# Patient Record
Sex: MALE | Race: ASIAN | NOT HISPANIC OR LATINO | Employment: FULL TIME | URBAN - METROPOLITAN AREA
[De-identification: names, ages, dates, MRNs, and addresses within clinical notes are randomized per-mention and may not be internally consistent; named-entity substitution may affect disease eponyms.]

---

## 2019-05-24 ENCOUNTER — APPOINTMENT (OUTPATIENT)
Dept: RADIOLOGY | Facility: MEDICAL CENTER | Age: 64
DRG: 065 | End: 2019-05-24
Attending: HOSPITALIST

## 2019-05-24 ENCOUNTER — APPOINTMENT (OUTPATIENT)
Dept: RADIOLOGY | Facility: MEDICAL CENTER | Age: 64
DRG: 065 | End: 2019-05-24
Attending: EMERGENCY MEDICINE

## 2019-05-24 ENCOUNTER — HOSPITAL ENCOUNTER (INPATIENT)
Facility: MEDICAL CENTER | Age: 64
LOS: 6 days | DRG: 065 | End: 2019-05-30
Attending: EMERGENCY MEDICINE | Admitting: HOSPITALIST

## 2019-05-24 DIAGNOSIS — I63.9 CEREBROVASCULAR ACCIDENT (CVA), UNSPECIFIED MECHANISM (HCC): ICD-10-CM

## 2019-05-24 DIAGNOSIS — R47.1 DYSARTHRIA: ICD-10-CM

## 2019-05-24 DIAGNOSIS — E11.65 TYPE 2 DIABETES MELLITUS WITH HYPERGLYCEMIA, WITHOUT LONG-TERM CURRENT USE OF INSULIN (HCC): ICD-10-CM

## 2019-05-24 DIAGNOSIS — R29.810 FACIAL DROOP: ICD-10-CM

## 2019-05-24 DIAGNOSIS — I10 ESSENTIAL HYPERTENSION: ICD-10-CM

## 2019-05-24 DIAGNOSIS — R53.1 WEAKNESS: ICD-10-CM

## 2019-05-24 PROBLEM — E11.9 DM (DIABETES MELLITUS) (HCC): Status: ACTIVE | Noted: 2019-05-24

## 2019-05-24 LAB
ABO GROUP BLD: NORMAL
ALBUMIN SERPL BCP-MCNC: 4.9 G/DL (ref 3.2–4.9)
ALBUMIN/GLOB SERPL: 1.7 G/DL
ALP SERPL-CCNC: 45 U/L (ref 30–99)
ALT SERPL-CCNC: 29 U/L (ref 2–50)
ANION GAP SERPL CALC-SCNC: 8 MMOL/L (ref 0–11.9)
APTT PPP: 29.9 SEC (ref 24.7–36)
AST SERPL-CCNC: 22 U/L (ref 12–45)
BASOPHILS # BLD AUTO: 0.5 % (ref 0–1.8)
BASOPHILS # BLD: 0.03 K/UL (ref 0–0.12)
BILIRUB SERPL-MCNC: 0.7 MG/DL (ref 0.1–1.5)
BLD GP AB SCN SERPL QL: NORMAL
BUN SERPL-MCNC: 21 MG/DL (ref 8–22)
CALCIUM SERPL-MCNC: 9.4 MG/DL (ref 8.5–10.5)
CHLORIDE SERPL-SCNC: 103 MMOL/L (ref 96–112)
CO2 SERPL-SCNC: 29 MMOL/L (ref 20–33)
CREAT SERPL-MCNC: 1.06 MG/DL (ref 0.5–1.4)
EKG IMPRESSION: NORMAL
EOSINOPHIL # BLD AUTO: 0.1 K/UL (ref 0–0.51)
EOSINOPHIL NFR BLD: 1.6 % (ref 0–6.9)
ERYTHROCYTE [DISTWIDTH] IN BLOOD BY AUTOMATED COUNT: 38.9 FL (ref 35.9–50)
GLOBULIN SER CALC-MCNC: 2.9 G/DL (ref 1.9–3.5)
GLUCOSE BLD-MCNC: 119 MG/DL (ref 65–99)
GLUCOSE SERPL-MCNC: 113 MG/DL (ref 65–99)
HCT VFR BLD AUTO: 49 % (ref 42–52)
HGB BLD-MCNC: 16 G/DL (ref 14–18)
IMM GRANULOCYTES # BLD AUTO: 0.01 K/UL (ref 0–0.11)
IMM GRANULOCYTES NFR BLD AUTO: 0.2 % (ref 0–0.9)
INR PPP: 0.99 (ref 0.87–1.13)
LYMPHOCYTES # BLD AUTO: 1.74 K/UL (ref 1–4.8)
LYMPHOCYTES NFR BLD: 28.6 % (ref 22–41)
MCH RBC QN AUTO: 28.3 PG (ref 27–33)
MCHC RBC AUTO-ENTMCNC: 32.7 G/DL (ref 33.7–35.3)
MCV RBC AUTO: 86.7 FL (ref 81.4–97.8)
MONOCYTES # BLD AUTO: 0.36 K/UL (ref 0–0.85)
MONOCYTES NFR BLD AUTO: 5.9 % (ref 0–13.4)
NEUTROPHILS # BLD AUTO: 3.85 K/UL (ref 1.82–7.42)
NEUTROPHILS NFR BLD: 63.2 % (ref 44–72)
NRBC # BLD AUTO: 0 K/UL
NRBC BLD-RTO: 0 /100 WBC
PLATELET # BLD AUTO: 202 K/UL (ref 164–446)
PMV BLD AUTO: 10 FL (ref 9–12.9)
POTASSIUM SERPL-SCNC: 4 MMOL/L (ref 3.6–5.5)
PROT SERPL-MCNC: 7.8 G/DL (ref 6–8.2)
PROTHROMBIN TIME: 13.2 SEC (ref 12–14.6)
RBC # BLD AUTO: 5.65 M/UL (ref 4.7–6.1)
RH BLD: NORMAL
SODIUM SERPL-SCNC: 140 MMOL/L (ref 135–145)
TROPONIN I SERPL-MCNC: <0.01 NG/ML (ref 0–0.04)
WBC # BLD AUTO: 6.1 K/UL (ref 4.8–10.8)

## 2019-05-24 PROCEDURE — 770001 HCHG ROOM/CARE - MED/SURG/GYN PRIV*

## 2019-05-24 PROCEDURE — A9270 NON-COVERED ITEM OR SERVICE: HCPCS | Performed by: HOSPITALIST

## 2019-05-24 PROCEDURE — 86850 RBC ANTIBODY SCREEN: CPT

## 2019-05-24 PROCEDURE — 84484 ASSAY OF TROPONIN QUANT: CPT

## 2019-05-24 PROCEDURE — 85610 PROTHROMBIN TIME: CPT

## 2019-05-24 PROCEDURE — 302242 IV POLE: Performed by: HOSPITALIST

## 2019-05-24 PROCEDURE — 700117 HCHG RX CONTRAST REV CODE 255: Performed by: EMERGENCY MEDICINE

## 2019-05-24 PROCEDURE — 70551 MRI BRAIN STEM W/O DYE: CPT

## 2019-05-24 PROCEDURE — 80053 COMPREHEN METABOLIC PANEL: CPT

## 2019-05-24 PROCEDURE — 85730 THROMBOPLASTIN TIME PARTIAL: CPT

## 2019-05-24 PROCEDURE — 99285 EMERGENCY DEPT VISIT HI MDM: CPT

## 2019-05-24 PROCEDURE — 85025 COMPLETE CBC W/AUTO DIFF WBC: CPT

## 2019-05-24 PROCEDURE — 86900 BLOOD TYPING SEROLOGIC ABO: CPT

## 2019-05-24 PROCEDURE — 302128 INFUSION PUMP: Performed by: HOSPITALIST

## 2019-05-24 PROCEDURE — 86901 BLOOD TYPING SEROLOGIC RH(D): CPT

## 2019-05-24 PROCEDURE — 93005 ELECTROCARDIOGRAM TRACING: CPT | Performed by: EMERGENCY MEDICINE

## 2019-05-24 PROCEDURE — 770020 HCHG ROOM/CARE - TELE (206)

## 2019-05-24 PROCEDURE — 36415 COLL VENOUS BLD VENIPUNCTURE: CPT

## 2019-05-24 PROCEDURE — 70498 CT ANGIOGRAPHY NECK: CPT

## 2019-05-24 PROCEDURE — 70496 CT ANGIOGRAPHY HEAD: CPT

## 2019-05-24 PROCEDURE — 71045 X-RAY EXAM CHEST 1 VIEW: CPT

## 2019-05-24 PROCEDURE — 700102 HCHG RX REV CODE 250 W/ 637 OVERRIDE(OP): Performed by: HOSPITALIST

## 2019-05-24 PROCEDURE — 82962 GLUCOSE BLOOD TEST: CPT

## 2019-05-24 PROCEDURE — 70450 CT HEAD/BRAIN W/O DYE: CPT

## 2019-05-24 PROCEDURE — 0042T CT-CEREBRAL PERFUSION ANALYSIS: CPT

## 2019-05-24 PROCEDURE — 700105 HCHG RX REV CODE 258: Performed by: HOSPITALIST

## 2019-05-24 PROCEDURE — 99223 1ST HOSP IP/OBS HIGH 75: CPT | Performed by: HOSPITALIST

## 2019-05-24 RX ORDER — ATORVASTATIN CALCIUM 40 MG/1
40 TABLET, FILM COATED ORAL NIGHTLY
Status: ON HOLD | COMMUNITY
End: 2019-05-30

## 2019-05-24 RX ORDER — ASPIRIN 325 MG
325 TABLET ORAL DAILY
Status: DISCONTINUED | OUTPATIENT
Start: 2019-05-24 | End: 2019-05-30 | Stop reason: HOSPADM

## 2019-05-24 RX ORDER — SODIUM CHLORIDE 9 MG/ML
INJECTION, SOLUTION INTRAVENOUS CONTINUOUS
Status: DISCONTINUED | OUTPATIENT
Start: 2019-05-24 | End: 2019-05-26

## 2019-05-24 RX ORDER — HYDRALAZINE HYDROCHLORIDE 20 MG/ML
10 INJECTION INTRAMUSCULAR; INTRAVENOUS
Status: DISCONTINUED | OUTPATIENT
Start: 2019-05-24 | End: 2019-05-30 | Stop reason: HOSPADM

## 2019-05-24 RX ORDER — LOSARTAN POTASSIUM 25 MG/1
25 TABLET ORAL DAILY
COMMUNITY

## 2019-05-24 RX ORDER — BISACODYL 10 MG
10 SUPPOSITORY, RECTAL RECTAL
Status: DISCONTINUED | OUTPATIENT
Start: 2019-05-24 | End: 2019-05-30 | Stop reason: HOSPADM

## 2019-05-24 RX ORDER — ASPIRIN 300 MG/1
300 SUPPOSITORY RECTAL DAILY
Status: DISCONTINUED | OUTPATIENT
Start: 2019-05-24 | End: 2019-05-30 | Stop reason: HOSPADM

## 2019-05-24 RX ORDER — ASPIRIN 81 MG/1
324 TABLET, CHEWABLE ORAL DAILY
Status: DISCONTINUED | OUTPATIENT
Start: 2019-05-24 | End: 2019-05-30 | Stop reason: HOSPADM

## 2019-05-24 RX ORDER — ATORVASTATIN CALCIUM 80 MG/1
80 TABLET, FILM COATED ORAL EVERY EVENING
Status: DISCONTINUED | OUTPATIENT
Start: 2019-05-24 | End: 2019-05-30 | Stop reason: HOSPADM

## 2019-05-24 RX ORDER — AMOXICILLIN 250 MG
2 CAPSULE ORAL 2 TIMES DAILY
Status: DISCONTINUED | OUTPATIENT
Start: 2019-05-25 | End: 2019-05-30 | Stop reason: HOSPADM

## 2019-05-24 RX ORDER — LABETALOL HYDROCHLORIDE 5 MG/ML
10 INJECTION, SOLUTION INTRAVENOUS EVERY 4 HOURS PRN
Status: DISCONTINUED | OUTPATIENT
Start: 2019-05-24 | End: 2019-05-30 | Stop reason: HOSPADM

## 2019-05-24 RX ORDER — POLYETHYLENE GLYCOL 3350 17 G/17G
1 POWDER, FOR SOLUTION ORAL
Status: DISCONTINUED | OUTPATIENT
Start: 2019-05-24 | End: 2019-05-30 | Stop reason: HOSPADM

## 2019-05-24 RX ADMIN — IOHEXOL 120 ML: 350 INJECTION, SOLUTION INTRAVENOUS at 18:28

## 2019-05-24 RX ADMIN — ASPIRIN 300 MG: 300 SUPPOSITORY RECTAL at 22:28

## 2019-05-24 RX ADMIN — SODIUM CHLORIDE: 9 INJECTION, SOLUTION INTRAVENOUS at 22:26

## 2019-05-24 ASSESSMENT — ENCOUNTER SYMPTOMS
FOCAL WEAKNESS: 1
CHILLS: 0
ABDOMINAL PAIN: 0
MYALGIAS: 0
NAUSEA: 0
WEAKNESS: 1
COUGH: 0
SPEECH CHANGE: 1
BLURRED VISION: 0
FEVER: 0
EYE DISCHARGE: 0
BRUISES/BLEEDS EASILY: 0
DIZZINESS: 0
HEMOPTYSIS: 0
HALLUCINATIONS: 0
DOUBLE VISION: 0
FLANK PAIN: 0
SHORTNESS OF BREATH: 0
PALPITATIONS: 0
VOMITING: 0
DEPRESSION: 0
SENSORY CHANGE: 0
HEARTBURN: 0

## 2019-05-24 ASSESSMENT — LIFESTYLE VARIABLES: SUBSTANCE_ABUSE: 0

## 2019-05-25 ENCOUNTER — APPOINTMENT (OUTPATIENT)
Dept: CARDIOLOGY | Facility: MEDICAL CENTER | Age: 64
DRG: 065 | End: 2019-05-25
Attending: HOSPITALIST

## 2019-05-25 LAB
ABO + RH BLD: NORMAL
ALBUMIN SERPL BCP-MCNC: 4.6 G/DL (ref 3.2–4.9)
ALBUMIN/GLOB SERPL: 2.2 G/DL
ALP SERPL-CCNC: 39 U/L (ref 30–99)
ALT SERPL-CCNC: 26 U/L (ref 2–50)
ANION GAP SERPL CALC-SCNC: 10 MMOL/L (ref 0–11.9)
AST SERPL-CCNC: 19 U/L (ref 12–45)
BASOPHILS # BLD AUTO: 0.4 % (ref 0–1.8)
BASOPHILS # BLD: 0.03 K/UL (ref 0–0.12)
BILIRUB SERPL-MCNC: 0.7 MG/DL (ref 0.1–1.5)
BUN SERPL-MCNC: 18 MG/DL (ref 8–22)
CALCIUM SERPL-MCNC: 7.8 MG/DL (ref 8.5–10.5)
CHLORIDE SERPL-SCNC: 105 MMOL/L (ref 96–112)
CHOLEST SERPL-MCNC: 86 MG/DL (ref 100–199)
CO2 SERPL-SCNC: 23 MMOL/L (ref 20–33)
CREAT SERPL-MCNC: 0.89 MG/DL (ref 0.5–1.4)
EOSINOPHIL # BLD AUTO: 0.15 K/UL (ref 0–0.51)
EOSINOPHIL NFR BLD: 2.1 % (ref 0–6.9)
ERYTHROCYTE [DISTWIDTH] IN BLOOD BY AUTOMATED COUNT: 39.4 FL (ref 35.9–50)
EST. AVERAGE GLUCOSE BLD GHB EST-MCNC: 166 MG/DL
GLOBULIN SER CALC-MCNC: 2.1 G/DL (ref 1.9–3.5)
GLUCOSE SERPL-MCNC: 86 MG/DL (ref 65–99)
HBA1C MFR BLD: 7.4 % (ref 0–5.6)
HCT VFR BLD AUTO: 46.5 % (ref 42–52)
HDLC SERPL-MCNC: 29 MG/DL
HGB BLD-MCNC: 14.9 G/DL (ref 14–18)
IMM GRANULOCYTES # BLD AUTO: 0.01 K/UL (ref 0–0.11)
IMM GRANULOCYTES NFR BLD AUTO: 0.1 % (ref 0–0.9)
LDLC SERPL CALC-MCNC: 43 MG/DL
LV EJECT FRACT  99904: 65
LV EJECT FRACT MOD 2C 99903: 51.14
LV EJECT FRACT MOD 4C 99902: 65.88
LV EJECT FRACT MOD BP 99901: 58.59
LYMPHOCYTES # BLD AUTO: 2.21 K/UL (ref 1–4.8)
LYMPHOCYTES NFR BLD: 30.2 % (ref 22–41)
MCH RBC QN AUTO: 28.3 PG (ref 27–33)
MCHC RBC AUTO-ENTMCNC: 32 G/DL (ref 33.7–35.3)
MCV RBC AUTO: 88.4 FL (ref 81.4–97.8)
MONOCYTES # BLD AUTO: 0.46 K/UL (ref 0–0.85)
MONOCYTES NFR BLD AUTO: 6.3 % (ref 0–13.4)
NEUTROPHILS # BLD AUTO: 4.45 K/UL (ref 1.82–7.42)
NEUTROPHILS NFR BLD: 60.9 % (ref 44–72)
NRBC # BLD AUTO: 0 K/UL
NRBC BLD-RTO: 0 /100 WBC
PLATELET # BLD AUTO: 181 K/UL (ref 164–446)
PMV BLD AUTO: 10.1 FL (ref 9–12.9)
POTASSIUM SERPL-SCNC: 4.8 MMOL/L (ref 3.6–5.5)
PROT SERPL-MCNC: 6.7 G/DL (ref 6–8.2)
RBC # BLD AUTO: 5.26 M/UL (ref 4.7–6.1)
SODIUM SERPL-SCNC: 138 MMOL/L (ref 135–145)
TRIGL SERPL-MCNC: 72 MG/DL (ref 0–149)
WBC # BLD AUTO: 7.3 K/UL (ref 4.8–10.8)

## 2019-05-25 PROCEDURE — 700102 HCHG RX REV CODE 250 W/ 637 OVERRIDE(OP): Performed by: HOSPITALIST

## 2019-05-25 PROCEDURE — 92610 EVALUATE SWALLOWING FUNCTION: CPT

## 2019-05-25 PROCEDURE — 700111 HCHG RX REV CODE 636 W/ 250 OVERRIDE (IP): Performed by: HOSPITALIST

## 2019-05-25 PROCEDURE — 93306 TTE W/DOPPLER COMPLETE: CPT

## 2019-05-25 PROCEDURE — 80061 LIPID PANEL: CPT

## 2019-05-25 PROCEDURE — 93306 TTE W/DOPPLER COMPLETE: CPT | Mod: 26 | Performed by: INTERNAL MEDICINE

## 2019-05-25 PROCEDURE — 80053 COMPREHEN METABOLIC PANEL: CPT

## 2019-05-25 PROCEDURE — 85025 COMPLETE CBC W/AUTO DIFF WBC: CPT

## 2019-05-25 PROCEDURE — 770020 HCHG ROOM/CARE - TELE (206)

## 2019-05-25 PROCEDURE — 36415 COLL VENOUS BLD VENIPUNCTURE: CPT

## 2019-05-25 PROCEDURE — A9270 NON-COVERED ITEM OR SERVICE: HCPCS | Performed by: HOSPITALIST

## 2019-05-25 PROCEDURE — 770001 HCHG ROOM/CARE - MED/SURG/GYN PRIV*

## 2019-05-25 PROCEDURE — 99233 SBSQ HOSP IP/OBS HIGH 50: CPT | Performed by: HOSPITALIST

## 2019-05-25 PROCEDURE — 83036 HEMOGLOBIN GLYCOSYLATED A1C: CPT

## 2019-05-25 RX ORDER — HEPARIN SODIUM 5000 [USP'U]/ML
5000 INJECTION, SOLUTION INTRAVENOUS; SUBCUTANEOUS EVERY 12 HOURS
Status: DISCONTINUED | OUTPATIENT
Start: 2019-05-25 | End: 2019-05-25

## 2019-05-25 RX ORDER — HEPARIN SODIUM 5000 [USP'U]/ML
5000 INJECTION, SOLUTION INTRAVENOUS; SUBCUTANEOUS EVERY 12 HOURS
Status: DISCONTINUED | OUTPATIENT
Start: 2019-05-25 | End: 2019-05-30 | Stop reason: HOSPADM

## 2019-05-25 RX ADMIN — SENNOSIDES,DOCUSATE SODIUM 2 TABLET: 8.6; 5 TABLET, FILM COATED ORAL at 17:15

## 2019-05-25 RX ADMIN — HEPARIN SODIUM 5000 UNITS: 5000 INJECTION, SOLUTION INTRAVENOUS; SUBCUTANEOUS at 22:10

## 2019-05-25 RX ADMIN — ASPIRIN 81 MG 324 MG: 81 TABLET ORAL at 17:16

## 2019-05-25 RX ADMIN — ATORVASTATIN CALCIUM 80 MG: 80 TABLET, FILM COATED ORAL at 17:15

## 2019-05-25 RX ADMIN — HEPARIN SODIUM 5000 UNITS: 5000 INJECTION, SOLUTION INTRAVENOUS; SUBCUTANEOUS at 12:11

## 2019-05-25 ASSESSMENT — COGNITIVE AND FUNCTIONAL STATUS - GENERAL
SUGGESTED CMS G CODE MODIFIER DAILY ACTIVITY: CJ
CLIMB 3 TO 5 STEPS WITH RAILING: A LITTLE
DAILY ACTIVITIY SCORE: 22
WALKING IN HOSPITAL ROOM: A LITTLE
DRESSING REGULAR LOWER BODY CLOTHING: A LITTLE
DRESSING REGULAR UPPER BODY CLOTHING: A LITTLE
SUGGESTED CMS G CODE MODIFIER MOBILITY: CJ
MOBILITY SCORE: 22

## 2019-05-25 ASSESSMENT — PATIENT HEALTH QUESTIONNAIRE - PHQ9
1. LITTLE INTEREST OR PLEASURE IN DOING THINGS: NOT AT ALL
SUM OF ALL RESPONSES TO PHQ9 QUESTIONS 1 AND 2: 0
2. FEELING DOWN, DEPRESSED, IRRITABLE, OR HOPELESS: NOT AT ALL

## 2019-05-25 ASSESSMENT — LIFESTYLE VARIABLES: ALCOHOL_USE: NO

## 2019-05-25 NOTE — ED TRIAGE NOTES
Chief Complaint   Patient presents with   • Possible Stroke     Family reports symptoms to have started around 1000 with right sides weakness/unsteady giat and slurred speech that progressively was worse around 1600.    • Slurred Speech   • Unsteady Gait   • Weakness     Pt to charge for stroke IR activation.

## 2019-05-25 NOTE — PROGRESS NOTES
"Pt A/Ox4. Denies numbness/tingling but reports a \"change in sensation\" and weakness to his right extremities. IV present and patent. Wife at bedside. Pt currently NPO, waiting for SLP to evaluate. No complaints at this time. Call light within reach.  "

## 2019-05-25 NOTE — H&P
Hospital Medicine History & Physical Note    Date of Service  5/24/2019    Primary Care Physician  No primary care provider on file.    Consultants  None    Code Status  full    Chief Complaint  Changes in speech, right sided leg weakness     History of Presenting Illness  63 y.o. male who presented 5/24/2019 with past medical history of diabetes, hypertension who presents with changes in speech and right-sided leg weakness.  This patient developed acute onset changes in speech around 2 PM today.  This was witnessed by his son.  He also was limping with right leg weakness.  He also had some left-sided facial droop.  Here in the emergency department his symptoms are persistent.  He has no other complaints.  No known alleviating or exacerbating factors.  He was seen by me outside the window for any TPA.  He will be admitted to the hospital for further management.    Review of Systems  Review of Systems   Constitutional: Negative for chills and fever.   HENT: Negative for congestion, hearing loss and tinnitus.    Eyes: Negative for blurred vision, double vision and discharge.   Respiratory: Negative for cough, hemoptysis and shortness of breath.    Cardiovascular: Negative for chest pain, palpitations and leg swelling.   Gastrointestinal: Negative for abdominal pain, heartburn, nausea and vomiting.   Genitourinary: Negative for dysuria and flank pain.   Musculoskeletal: Negative for joint pain and myalgias.   Skin: Negative for rash.   Neurological: Positive for speech change, focal weakness and weakness. Negative for dizziness and sensory change.   Endo/Heme/Allergies: Negative for environmental allergies. Does not bruise/bleed easily.   Psychiatric/Behavioral: Negative for depression, hallucinations and substance abuse.       Past Medical History  Htn, dm  Surgical History  Reviewed and not pertinent  Family History  reveiwed and not pertinent     Social History  Denies alcohol abuse, drug use or  smoking    Allergies  noknown allergies    Medications  None       Physical Exam  Temp:  [36.5 °C (97.7 °F)] 36.5 °C (97.7 °F)  Pulse:  [59-70] 70  Resp:  [16] 16  BP: (162)/(82) 162/82  SpO2:  [96 %-98 %] 96 %    Physical Exam   Constitutional: He is oriented to person, place, and time. He appears well-developed and well-nourished. He appears distressed.   HENT:   Head: Normocephalic and atraumatic.   Eyes: Pupils are equal, round, and reactive to light. Conjunctivae and EOM are normal.   Neck: Normal range of motion. Neck supple. No JVD present.   Cardiovascular: Normal rate, regular rhythm, normal heart sounds and intact distal pulses.    No murmur heard.  Pulmonary/Chest: Effort normal and breath sounds normal. No respiratory distress. He exhibits no tenderness.   Abdominal: Soft. Bowel sounds are normal. He exhibits no distension. There is no tenderness.   Musculoskeletal:   Right lower extremityw eakness 3/5 with + pronator drift, dysarthria and left sided facial droop   Neurological: He is alert and oriented to person, place, and time. No cranial nerve deficit. He exhibits normal muscle tone.   Skin: Skin is warm and dry. No erythema.   Psychiatric: He has a normal mood and affect. His behavior is normal. Judgment and thought content normal.   Nursing note and vitals reviewed.      Laboratory:  Recent Labs      05/24/19   1807   WBC  6.1   RBC  5.65   HEMOGLOBIN  16.0   HEMATOCRIT  49.0   MCV  86.7   MCH  28.3   MCHC  32.7*   RDW  38.9   PLATELETCT  202   MPV  10.0         No results for input(s): ALTSGPT, ASTSGOT, ALKPHOSPHAT, TBILIRUBIN, DBILIRUBIN, GAMMAGT, AMYLASE, LIPASE, ALB, PREALBUMIN, GLUCOSE in the last 72 hours.  Recent Labs      05/24/19   1807   APTT  29.9   INR  0.99             No results for input(s): TROPONINI in the last 72 hours.    Urinalysis:    No results found     Imaging:  DX-CHEST-PORTABLE (1 VIEW)   Final Result      No acute cardiac or pulmonary abnormality is noted.      CT-CTA  HEAD WITH & W/O-POST PROCESS   Final Result      No large vessel occlusion, high-grade stenosis or aneurysm of the [place].      CT-CTA NECK WITH & W/O-POST PROCESSING   Final Result      CT angiogram of the neck within normal limits.      CT-HEAD W/O   Final Result      1.  No acute intracranial process.      2.  Atrophy and small vessel ischemic change.      CT-CEREBRAL PERFUSION ANALYSIS   Final Result      1.  Cerebral blood flow less than 30% likely representing completed infarct = 0 mL.      2.  T Max more than 6 seconds likely representing combination of completed infarct and ischemia = 0 mL.      3.  Mismatched volume likely representing ischemic brain/penumbra = None      4.  Please note that the cerebral perfusion was performed on the limited brain tissue around the basal ganglia region. Infarct/ischemia outside the CT perfusion sections can be missed in this study.      MR-BRAIN-W/O    (Results Pending)   EC-ECHOCARDIOGRAM COMPLETE W/O CONT    (Results Pending)         Assessment/Plan:  I anticipate this patient will require at least two midnights for appropriate medical management, necessitating inpatient admission.    * CVA (cerebral vascular accident) (HCC)   Assessment & Plan    Outside of window no TPA given   NIH 4   Neuro checks   Allow for permissive htn   PT/OT/Speech evaluations  MRI brain, echo   Asa, statin  Lipid panel/a1c  Consult neuro in the am      DM (diabetes mellitus) (HCC)   Assessment & Plan    SSI ordered  a1c        HTN (hypertension)   Assessment & Plan    Allowing for permissive htn   Hold home meds for now         VTE prophylaxis: scd

## 2019-05-25 NOTE — ASSESSMENT & PLAN NOTE
Outside of window no TPA given   NIH 4 on arrival  Continue neuro checks  Allow for permissive HTN through 5/26  Acute left pontine infarct  Continue asa and statin  Continue therapies  Snf/ rehab acceptance pending

## 2019-05-25 NOTE — PROGRESS NOTES
Hospital Medicine Daily Progress Note    Date of Service  5/25/2019    Chief Complaint  63 y.o. male admitted 5/24/2019 with speech changes and right-sided weakness    Hospital Course    63 y.o. male who presented 5/24/2019 with past medical history of diabetes, hypertension who presents with changes in speech and right-sided leg weakness.    He was outside the window for any TPA.  CT noncontrast negative for acute CVA and CTA head and neck negative for aneurysm or stenosis.       Interval Problem Update  Seen and examined this morning, wife at bedside  Still having speech difficulties, right hand and arm weakness, right lower extremity improving  Lungs clear, patient regular rhythm, no other complaints  Awaiting MRI read and further stroke work-up  HDL 29 LDL 43 triglyceride 72    Consultants/Specialty  Neurology    Code Status  Full code    Disposition  Inpatient, continue on telemetry on neuro floor    Review of Systems  Review of Systems   Unable to perform ROS: Acuity of condition        Physical Exam  Temp:  [36.4 °C (97.5 °F)-36.9 °C (98.4 °F)] 36.6 °C (97.9 °F)  Pulse:  [50-70] 50  Resp:  [16-17] 17  BP: (137-166)/(61-82) 137/61  SpO2:  [95 %-98 %] 95 %    Physical Exam   Constitutional: He appears well-developed and well-nourished. No distress.   HENT:   Head: Normocephalic and atraumatic.   Mouth/Throat: Oropharynx is clear and moist.   Eyes: Conjunctivae and EOM are normal. Right eye exhibits no discharge. Left eye exhibits no discharge. No scleral icterus.   Neck: Normal range of motion. Neck supple.   Cardiovascular: Normal rate, normal heart sounds and intact distal pulses.    No murmur heard.  Pulmonary/Chest: Effort normal and breath sounds normal. No stridor. No respiratory distress. He has no wheezes.   Abdominal: Soft. Bowel sounds are normal. He exhibits no distension. There is no tenderness. There is no rebound.   Musculoskeletal: He exhibits no edema or tenderness.   Neurological: He is alert.    Unable to assess orientation due to aphasia/confusion  Right upper extremity strength 3/5  Right lower extremity strength 4/5   Skin: Skin is warm and dry. No rash noted. He is not diaphoretic. No erythema. No pallor.   Psychiatric:   Confused   Nursing note and vitals reviewed.      Fluids  No intake or output data in the 24 hours ending 05/25/19 0934    Laboratory  Recent Labs      05/24/19 1807 05/25/19 0215   WBC  6.1  7.3   RBC  5.65  5.26   HEMOGLOBIN  16.0  14.9   HEMATOCRIT  49.0  46.5   MCV  86.7  88.4   MCH  28.3  28.3   MCHC  32.7*  32.0*   RDW  38.9  39.4   PLATELETCT  202  181   MPV  10.0  10.1     Recent Labs      05/24/19 1807 05/25/19 0215   SODIUM  140  138   POTASSIUM  4.0  4.8   CHLORIDE  103  105   CO2  29  23   GLUCOSE  113*  86   BUN  21  18   CREATININE  1.06  0.89   CALCIUM  9.4  7.8*     Recent Labs      05/24/19 1807   APTT  29.9   INR  0.99         Recent Labs      05/25/19   0215   TRIGLYCERIDE  72   HDL  29*   LDL  43       Imaging  DX-CHEST-PORTABLE (1 VIEW)   Final Result      No acute cardiac or pulmonary abnormality is noted.      CT-CTA HEAD WITH & W/O-POST PROCESS   Final Result      No large vessel occlusion, high-grade stenosis or aneurysm of the [place] [place].      CT-CTA NECK WITH & W/O-POST PROCESSING   Final Result      CT angiogram of the neck within normal limits.      CT-HEAD W/O   Final Result      1.  No acute intracranial process.      2.  Atrophy and small vessel ischemic change.      CT-CEREBRAL PERFUSION ANALYSIS   Final Result      1.  Cerebral blood flow less than 30% likely representing completed infarct = 0 mL.      2.  T Max more than 6 seconds likely representing combination of completed infarct and ischemia = 0 mL.      3.  Mismatched volume likely representing ischemic brain/penumbra = None      4.  Please note that the cerebral perfusion was performed on the limited brain tissue around the basal ganglia region. Infarct/ischemia outside the  CT perfusion sections can be missed in this study.      MR-BRAIN-W/O    (Results Pending)   EC-ECHOCARDIOGRAM COMPLETE W/O CONT    (Results Pending)        Assessment/Plan  * CVA (cerebral vascular accident) ([facility])   Assessment & Plan    Outside of window no TPA given   NIH 4 on arrival  Continue neuro checks   Allow for permissive htn   PT/OT/Speech evaluations pending  MRI brain, echo pending  Asa, statin continues  A1c pending  Lipid panel okay  Awaiting MRI results, will discuss with neurology     DM (diabetes mellitus) ([facility])   Assessment & Plan    SSI ordered  a1c        HTN (hypertension)   Assessment & Plan    Allowing for permissive htn   Hold home meds for now          VTE prophylaxis: SCDs, can start heparin q12

## 2019-05-25 NOTE — ED NOTES
Patient to CT w/TNTL on cardiac monitor then BL 19, report to SUSANNE Lantigua, family at bedside.

## 2019-05-25 NOTE — THERAPY
"Speech Language Therapy Clinical Swallow Evaluation completed.  Functional Status: 62 YO male seen this date for clinical swallow evaluation 2/2 left pontine CVA and failed nurse dysphagia screen. Pt is primarily Armenian speaking, attempted to use Language [employer] for translation, program was \"initializing\" for entire 40 minutes of evaluation. Pt was awake, alert, followed directions with visual and verbal cues and participated in evaluation. Pt's son served as  long-term through evaluation. Oral mechanism examination completed. Lingual deviation to the right upon protrusion. Tense right sided facial structures appreciated resulting in asymmetry. Dentition intact but of poor quality. Right upper extremity weakness observed, characterized by inability to hold pudding cup. Pt's son reports pt's speech is different than normal and not clear. PO trials of ice, NTL, dry solids, mixed consistency and thins via cup and straw assessed. Hyolaryngeal elevation palpated as complete, timely initiation of swallow appreciated and clear vocal quality maintained throughout evaluation. Pt demonstrated adequat rotary jaw movement for pulverization and manipulation of bolus. Throat clear appreciated in 30% of mixed consistency trials, however vocal quality remained clear. Suspect throat clear 2/2 to pt's spouse feeding him large bites at a fast rate. No c/o odynophagia or globus sensation. No other s/sx of aspiration appreciated with any other consistencies consumed. Education provided re: importance of slow rate and small bite size. Recommend initiation of D3/thin liquid diet with adherence to the following strategies: HOB at 90*, small bites/sips, slow rate, feeding assistance as necessary,  SLP following.   Recommendations - Diet: Diet / Liquid Recommendation: Dysphagia III, Thin Liquid                          Strategies: Monitor during meals, Assistance needed for meal tray set-up and Head of Bed at 90 Degrees                " "          Medication Administration: Medication Administration : Float Whole with Puree  Plan of Care: Will benefit from Speech Therapy 5 times per week  Post-Acute Therapy: Recommend inpatient transitional care services for continued speech therapy services.        See \"Rehab Therapy-Acute\" Patient Summary Report for complete documentation.   "

## 2019-05-25 NOTE — PROGRESS NOTES
2 RN skin check complete. Skin is intact around bony prominences, no signs of pressure injuries or wounds at this time. Pt would not allow female staff to assess sacrum, per report he has intact skin.

## 2019-05-25 NOTE — DISCHARGE PLANNING
Aware of PMR referral from Dr. Quispe. Changes in speech, right-sided weakness. Outside window for tPA. MRI brain - acute left pontine infarct. Workup ongoing, therapy evals pending. No Physiatry consult ordered per protocol. TCC following. Thank you for the referral.     Please have PFA screen for insurance provider.

## 2019-05-25 NOTE — ED PROVIDER NOTES
ED Provider Note    Scribed for Gavino Yost M.D. by Buffy Massey. 5/24/2019  6:10 PM    Primary care provider: No primary care provider on file.  Means of arrival: EMS  History obtained from: Patient  History limited by: None    CHIEF COMPLAINT  Chief Complaint   Patient presents with   • Possible Stroke     Family reports symptoms to have started around 1000 with right sides weakness/unsteady giat and slurred speech that progressively was worse around 1600.    • Slurred Speech   • Unsteady Gait   • Weakness       HPI  Delbert Esequiel Montague is a 63 y.o. male, with history of diabetes and hypertension, who presents to the Emergency Department for slight facial weakness, onset at 1400 today. This morning at 1000, the patient experienced some dizziness and had 1 episode of vomiting. Later this evening, he started to experience associated right arm numbness, right lower extremity weakness, and slurred speech. He denies fevers, rhinorrhea, cough, congestion, or other infectious symptoms.    REVIEW OF SYSTEMS  Pertinent positives include right facial weakness, slurred speech, dizziness, right upper extremity numbness, and right lower extremity weakness. Pertinent negatives include no rhinorrhea, cough, or congestion.  All other systems reviewed and negative.    PAST MEDICAL HISTORY     Patient has a history of diabetes and hypertension. He has no history of myocardial infarction of TIA.     SURGICAL HISTORY  patient denies any surgical history    SOCIAL HISTORY  Social History   Substance Use Topics   • Smoking status: None noted   • Smokeless tobacco: None noted   • Alcohol use None noted      History   Drug use: None noted       FAMILY HISTORY  No family history on file.    CURRENT MEDICATIONS  Home Medications     Reviewed by Lars Joe (Pharmacy Tech) on 05/24/19 at 1934  Med List Status: Complete   Medication Last Dose Status   atorvastatin (LIPITOR) 40 MG Tab 5/23/2019 Active   Canagliflozin (INVOKANA)  100 MG Tab 5/24/2019 Active   losartan (COZAAR) 25 MG Tab 5/24/2019 Active   metformin (GLUCOPHAGE) 1000 MG tablet 5/24/2019 Active   Multiple Vitamins-Minerals (MENS 50+ MULTI VITAMIN/MIN) Tab 5/24/2019 Active                ALLERGIES  No Known Allergies    PHYSICAL EXAM  VITAL SIGNS: BP (!) 162/82   Pulse (!) 59   Temp 36.5 °C (97.7 °F) (Temporal)   Resp 16   Wt 63 kg (138 lb 14.2 oz)   SpO2 98%     Constitutional: Well developed, Well nourished, Mild distress, Non-toxic appearance.   HENT: Normocephalic, Atraumatic, Bilateral external ears normal, Oropharynx moist, No oral exudates.   Eyes: PERRLA, EOMI, Conjunctiva normal, No discharge.   Neck: No tenderness, Supple, No stridor.   Lymphatic: No lymphadenopathy noted.   Cardiovascular: Normal heart rate, Normal rhythm.   Thorax & Lungs: Clear to auscultation bilaterally, No respiratory distress, No wheezing, No crackles.   Abdomen: Soft, No tenderness, No masses, No pulsatile masses.   Skin: Warm, Dry, No erythema, No rash.   Extremities:, No edema No cyanosis.   Musculoskeletal: No tenderness to palpation or major deformities noted.  Intact distal pulses  Neurologic: Awake, alert. Moves all extremities spontaneously. Drift on the right leg. No drift on the right arm or any other extremity. Decreased sensation on the right lower extremity.  Psychiatric: Affect normal, Judgment normal, Mood normal.     LABS  Labs Reviewed   CBC WITH DIFFERENTIAL - Abnormal; Notable for the following:        Result Value    MCHC 32.7 (*)     All other components within normal limits    Narrative:     Indicate which anticoagulants the patient is on:->UNKNOWN   COMP METABOLIC PANEL - Abnormal; Notable for the following:     Glucose 113 (*)     All other components within normal limits    Narrative:     Indicate which anticoagulants the patient is on:->UNKNOWN   ACCU-CHEK GLUCOSE - Abnormal; Notable for the following:     Glucose - Accu-Ck 119 (*)     All other components within  normal limits   PROTHROMBIN TIME    Narrative:     Indicate which anticoagulants the patient is on:->UNKNOWN   APTT    Narrative:     Indicate which anticoagulants the patient is on:->UNKNOWN   COD (ADULT)   TROPONIN    Narrative:     Indicate which anticoagulants the patient is on:->UNKNOWN   ESTIMATED GFR    Narrative:     Indicate which anticoagulants the patient is on:->UNKNOWN   URINALYSIS,CULTURE IF INDICATED   ABO RH CONFIRM   HEMOGLOBIN A1C   CBC WITH DIFFERENTIAL   COMP METABOLIC PANEL   LIPID PROFILE     All labs reviewed by me.    EKG  Results for orders placed or performed during the hospital encounter of 19   EKG (NOW)   Result Value Ref Range    Report       [facility]    Test Date:  2019  Pt Name:    EROS LAWLER                 Department: ER  MRN:        [id number]                      Room:        19  Gender:     Male                         Technician: [id number]  :        1955                   Requested By:ZAC SANDRA  Order #:    [id number]                    Reading MD: ZAC SANDRA MD    Measurements  Intervals                                Axis  Rate:       60                           P:          67  TN:         172                          QRS:        44  QRSD:       144                          T:          23  QT:         432  QTc:        432    Interpretive Statements  SINUS RHYTHM  RIGHT BUNDLE BRANCH BLOCK  No previous ECG available for comparison    Electronically Signed On 2019 18:43:42 PDT by ZAC SANDRA MD       RADIOLOGY  DX-CHEST-PORTABLE (1 VIEW)   Final Result      No acute cardiac or pulmonary abnormality is noted.      CT-CTA HEAD WITH & W/O-POST PROCESS   Final Result      No large vessel occlusion, high-grade stenosis or aneurysm of the [place].      CT-CTA NECK WITH & W/O-POST PROCESSING   Final Result      CT angiogram of the neck within normal limits.      CT-HEAD W/O   Final Result       1.  No acute intracranial process.      2.  Atrophy and small vessel ischemic change.      CT-CEREBRAL PERFUSION ANALYSIS   Final Result      1.  Cerebral blood flow less than 30% likely representing completed infarct = 0 mL.      2.  T Max more than 6 seconds likely representing combination of completed infarct and ischemia = 0 mL.      3.  Mismatched volume likely representing ischemic brain/penumbra = None      4.  Please note that the cerebral perfusion was performed on the limited brain tissue around the basal ganglia region. Infarct/ischemia outside the CT perfusion sections can be missed in this study.      MR-BRAIN-W/O    (Results Pending)   EC-ECHOCARDIOGRAM COMPLETE W/O CONT    (Results Pending)     The radiologist's interpretation of all radiological studies have been reviewed by me.      COURSE & MEDICAL DECISION MAKING  Pertinent Labs & Imaging studies reviewed. (See chart for details)    I reviewed the patient's medical records which showed nothing    6:10 PM - Patient seen and examined at bedside. Ordered DX-Chest, CT-Head, CT-Cerebral perfusion analysis, CT-CTA Head with and without, CT-CTA neck with and without, CBC with differential, CMP, PTT, APTT, COD (Adult), Troponin, urinalysis with culture if indicated, and EKG to evaluate his symptoms. Total NIH Stoke Scale is 4 for dysarthria, decreased sensation on the right, drift on the right lower extremity, and facial droop. I discussed with the patient's family that he is outside the window for TPA and plan for CT imaging to evaluate for a clot and admission to the hospital.    6:42 PM - I discussed the patient's case and the above findings with Dr. Quispe (Hospitalist) who agrees to accept the patient to his service. Care transitioned at this time.    Decision Making:  Patient is coming in with left-sided facial droop, very faint, the patient is also having a little bit of dysarthria, some drift, decreased since recent changes on my right leg.   Patient's NIH is 4, the patient is out of the alteplase window therefore alteplase was not given.  CT perfusion scan was negative, CTAs were unremarkable, discussed the case with the hospitalist for admission the hospital.    DISPOSITION:  Patient will be admitted to Dr. Quispe in guarded condition.    FINAL IMPRESSION  1. Facial droop    2. Weakness    3. Dysarthria          Buffy ROE (Scribe), am scribing for, and in the presence of, Gavino Yost M.D..    Electronically signed by: Buffy Massey (Scribe), 5/24/2019    IGavino M.D. personally performed the services described in this documentation, as scribed by Buffy Massey in my presence, and it is both accurate and complete.    The note accurately reflects work and decisions made by me.  Gavino Yost  5/24/2019  11:36 PM    C

## 2019-05-25 NOTE — PROGRESS NOTES
Pt arrived to floor via transport. Family at bedside. AAOx4, reporting R sided weakness, numbness and tingling. Denies pain. Cardiac monitor in place. SCDs on. Pt NPO per ER RN dysphagia screen. POC discussed. Pt resting comfortably, treaded socks in place, bed locked and in lowest position, bed alarm on, call light in reach, hourly rounding in place.

## 2019-05-25 NOTE — ED NOTES
Med Rec updated and complete per rx bottles at bedside  Allergies have been verified   No oral ABX within the last 30 days  Pt Home Pharmacy:[facility]

## 2019-05-26 LAB
ANION GAP SERPL CALC-SCNC: 11 MMOL/L (ref 0–11.9)
BASOPHILS # BLD AUTO: 0.3 % (ref 0–1.8)
BASOPHILS # BLD: 0.02 K/UL (ref 0–0.12)
BUN SERPL-MCNC: 25 MG/DL (ref 8–22)
CALCIUM SERPL-MCNC: 9.2 MG/DL (ref 8.5–10.5)
CHLORIDE SERPL-SCNC: 103 MMOL/L (ref 96–112)
CO2 SERPL-SCNC: 25 MMOL/L (ref 20–33)
CREAT SERPL-MCNC: 0.96 MG/DL (ref 0.5–1.4)
EOSINOPHIL # BLD AUTO: 0.14 K/UL (ref 0–0.51)
EOSINOPHIL NFR BLD: 1.8 % (ref 0–6.9)
ERYTHROCYTE [DISTWIDTH] IN BLOOD BY AUTOMATED COUNT: 38.3 FL (ref 35.9–50)
GLUCOSE BLD-MCNC: 137 MG/DL (ref 65–99)
GLUCOSE BLD-MCNC: 159 MG/DL (ref 65–99)
GLUCOSE SERPL-MCNC: 117 MG/DL (ref 65–99)
HCT VFR BLD AUTO: 44.8 % (ref 42–52)
HGB BLD-MCNC: 15 G/DL (ref 14–18)
IMM GRANULOCYTES # BLD AUTO: 0.02 K/UL (ref 0–0.11)
IMM GRANULOCYTES NFR BLD AUTO: 0.3 % (ref 0–0.9)
LYMPHOCYTES # BLD AUTO: 1.33 K/UL (ref 1–4.8)
LYMPHOCYTES NFR BLD: 17.3 % (ref 22–41)
MCH RBC QN AUTO: 28.8 PG (ref 27–33)
MCHC RBC AUTO-ENTMCNC: 33.5 G/DL (ref 33.7–35.3)
MCV RBC AUTO: 86.2 FL (ref 81.4–97.8)
MONOCYTES # BLD AUTO: 0.5 K/UL (ref 0–0.85)
MONOCYTES NFR BLD AUTO: 6.5 % (ref 0–13.4)
NEUTROPHILS # BLD AUTO: 5.7 K/UL (ref 1.82–7.42)
NEUTROPHILS NFR BLD: 73.8 % (ref 44–72)
NRBC # BLD AUTO: 0 K/UL
NRBC BLD-RTO: 0 /100 WBC
PLATELET # BLD AUTO: 193 K/UL (ref 164–446)
PMV BLD AUTO: 10.5 FL (ref 9–12.9)
POTASSIUM SERPL-SCNC: 3.6 MMOL/L (ref 3.6–5.5)
RBC # BLD AUTO: 5.2 M/UL (ref 4.7–6.1)
SODIUM SERPL-SCNC: 139 MMOL/L (ref 135–145)
WBC # BLD AUTO: 7.7 K/UL (ref 4.8–10.8)

## 2019-05-26 PROCEDURE — 97166 OT EVAL MOD COMPLEX 45 MIN: CPT

## 2019-05-26 PROCEDURE — 85025 COMPLETE CBC W/AUTO DIFF WBC: CPT

## 2019-05-26 PROCEDURE — 97162 PT EVAL MOD COMPLEX 30 MIN: CPT

## 2019-05-26 PROCEDURE — 36415 COLL VENOUS BLD VENIPUNCTURE: CPT

## 2019-05-26 PROCEDURE — 700111 HCHG RX REV CODE 636 W/ 250 OVERRIDE (IP): Performed by: HOSPITALIST

## 2019-05-26 PROCEDURE — 82962 GLUCOSE BLOOD TEST: CPT

## 2019-05-26 PROCEDURE — 770020 HCHG ROOM/CARE - TELE (206)

## 2019-05-26 PROCEDURE — 700102 HCHG RX REV CODE 250 W/ 637 OVERRIDE(OP): Performed by: HOSPITALIST

## 2019-05-26 PROCEDURE — A9270 NON-COVERED ITEM OR SERVICE: HCPCS | Performed by: HOSPITALIST

## 2019-05-26 PROCEDURE — 80048 BASIC METABOLIC PNL TOTAL CA: CPT

## 2019-05-26 PROCEDURE — 99233 SBSQ HOSP IP/OBS HIGH 50: CPT | Performed by: HOSPITALIST

## 2019-05-26 RX ORDER — ACETAMINOPHEN 325 MG/1
650 TABLET ORAL EVERY 6 HOURS PRN
Status: DISCONTINUED | OUTPATIENT
Start: 2019-05-26 | End: 2019-05-30 | Stop reason: HOSPADM

## 2019-05-26 RX ADMIN — HEPARIN SODIUM 5000 UNITS: 5000 INJECTION, SOLUTION INTRAVENOUS; SUBCUTANEOUS at 20:29

## 2019-05-26 RX ADMIN — ATORVASTATIN CALCIUM 80 MG: 80 TABLET, FILM COATED ORAL at 18:10

## 2019-05-26 RX ADMIN — METFORMIN HYDROCHLORIDE 1000 MG: 500 TABLET, FILM COATED ORAL at 12:07

## 2019-05-26 RX ADMIN — ASPIRIN 325 MG: 325 TABLET, FILM COATED ORAL at 18:07

## 2019-05-26 RX ADMIN — SENNOSIDES,DOCUSATE SODIUM 2 TABLET: 8.6; 5 TABLET, FILM COATED ORAL at 04:29

## 2019-05-26 RX ADMIN — METFORMIN HYDROCHLORIDE 1000 MG: 500 TABLET, FILM COATED ORAL at 18:11

## 2019-05-26 RX ADMIN — HEPARIN SODIUM 5000 UNITS: 5000 INJECTION, SOLUTION INTRAVENOUS; SUBCUTANEOUS at 11:58

## 2019-05-26 ASSESSMENT — COGNITIVE AND FUNCTIONAL STATUS - GENERAL
CLIMB 3 TO 5 STEPS WITH RAILING: A LITTLE
WALKING IN HOSPITAL ROOM: A LITTLE
DRESSING REGULAR UPPER BODY CLOTHING: A LITTLE
SUGGESTED CMS G CODE MODIFIER MOBILITY: CJ
MOBILITY SCORE: 20
DAILY ACTIVITIY SCORE: 22
SUGGESTED CMS G CODE MODIFIER DAILY ACTIVITY: CJ
MOVING FROM LYING ON BACK TO SITTING ON SIDE OF FLAT BED: A LITTLE
DRESSING REGULAR LOWER BODY CLOTHING: A LITTLE
STANDING UP FROM CHAIR USING ARMS: A LITTLE

## 2019-05-26 ASSESSMENT — GAIT ASSESSMENTS
GAIT LEVEL OF ASSIST: MINIMAL ASSIST
DISTANCE (FEET): 100
DEVIATION: DECREASED BASE OF SUPPORT;SHUFFLED GAIT;DECREASED HEEL STRIKE;DECREASED TOE OFF;OTHER (COMMENT)
ASSISTIVE DEVICE: FRONT WHEEL WALKER

## 2019-05-26 ASSESSMENT — ACTIVITIES OF DAILY LIVING (ADL): TOILETING: INDEPENDENT

## 2019-05-26 NOTE — PROGRESS NOTES
Pt's family at bedside. Educated family about pt's disease process and prescribed treatment plan. Demonstrated understanding.

## 2019-05-26 NOTE — CARE PLAN
Problem: Safety  Goal: Will remain free from injury  Outcome: PROGRESSING AS EXPECTED  Pt has remained free from injury this visit. Pt calls for help appropriately. Fall precautions in place, pt educated on fall risk.    Problem: Infection  Goal: Will remain free from infection  Outcome: PROGRESSING AS EXPECTED  No s/s of infection. Pt educated on hand hygiene.

## 2019-05-26 NOTE — THERAPY
"Physical Therapy Evaluation completed.   Bed Mobility:  Supine to Sit:  (NT, up in bathroom self (culture requires privacy) on entry)  Transfers: Sit to Stand: Minimal Assist (for safety)  Gait: Level Of Assist: Minimal Assist with Front-Wheel Walker       Plan of Care: Will benefit from Physical Therapy 5 times per week  Discharge Recommendations: Equipment: Will Continue to Assess for Equipment Needs. Post-acute therapy: see below.    See \"Rehab Therapy-Acute\" Patient Summary Report for complete documentation.    Patient is a 64 YO male that was admitted 5/24 following complaints of altered speech and R sided weakness. PMHx significant for DM, HTN. He presented to PT with impaired safety awareness and poor insight into deficits, impaired speech, impaired motor function, impaired balance, and functional weakness in R extremities which are limiting his ability to safely perform mobility at [facility]. He ambulated approximately 100ft with FWW and min A. Trialed gait without AD (patient reported no AD at baseline) and patient unsteady, recommend FWW for ambulation at this time. Recommend post acute placement prior to return home but anticipate cultural preferences may prevent patient from full participation. Need to verify level of support available at home. Will continue to follow while in house.  "

## 2019-05-26 NOTE — PROGRESS NOTES
Bedside report given by mignon Maravilla. Assumed care at 1900. Pt A&Ox4. Reporting no pain. Pt states he feels he is getting stronger. Ambulated to bathroom. IV fluids running. POC discussed. Pt resting comfortably, treaded socks in place, bed locked and in lowest position, bed alarm on, call light in reach, hourly rounding in place.

## 2019-05-26 NOTE — PROGRESS NOTES
Pt A/Ox4. Reports change of sensation on his right upper/lower extremities. Pt displays right sided weakness and right upper facial droop. IV present and patent. Denies pain. Wife at bedside. No complaints at this time. Call light within reach.

## 2019-05-26 NOTE — PROGRESS NOTES
MD notified about pt HR today, dropping as low as 39 this AM and sustaining at 46 most recently. No new orders placed, MD did change parameters for the monitor room to call if HR is <45bpm. Monitor room notified.

## 2019-05-26 NOTE — CARE PLAN
Problem: Infection  Goal: Will remain free from infection  Outcome: PROGRESSING AS EXPECTED  VSS, afebrile, labs WDL. Proper hand hygiene performed during pt care. No s/s of infection.     Problem: Knowledge Deficit  Goal: Knowledge of the prescribed therapeutic regimen will improve  Outcome: PROGRESSING AS EXPECTED  Pt educated on plan of care and disease process. All questions answered. Pt demonstrates understanding.

## 2019-05-26 NOTE — THERAPY
"Occupational Therapy Evaluation completed.   Functional Status:  SPV for grooming while standing; min A for LB dressing and functional transfers  Plan of Care: Will benefit from Occupational Therapy 5 times per week  Discharge Recommendations:  Equipment: Will Continue to Assess for Equipment Needs. Recommend inpatient transitional care services for continued occupational therapy services.     See \"Rehab Therapy-Acute\" Patient Summary Report for complete documentation.    OT eval completed on 62 YO M admitted with speech changes and right-sided weakness. Pt presented with decreased RUE coordination and strength however reported intact sensation. Pt presented with decreased RUE strength and coordination which is limiting pt's independence with BADLs. Pt reports I with BADLs at baseline and son and SO are available to provide assist as needed upon d/c. Pt may benefit from short inpatient transitional care stay prior to d/c home. Will continue to follow for acute OT services while in-house.   "

## 2019-05-26 NOTE — PROGRESS NOTES
[facility] Daily Progress Note    Date of Service  5/26/2019    Chief Complaint  63 y.o. male admitted 5/24/2019 with speech changes and right-sided weakness    Hospital Course    63 y.o. male who presented 5/24/2019 with past medical history of diabetes, hypertension who presents with changes in speech and right-sided leg weakness.    He was outside the window for any TPA.  CT noncontrast negative for acute CVA and CTA head and neck negative for aneurysm or stenosis.       Interval Problem Update  Seen and examined this morning, wife at bedside, answered questions  Still having speech difficulties, right hand and arm weakness, right lower extremity improving  Lungs clear, patient regular rhythm, no other complaints  HDL 29 LDL 43 triglyceride 72    MRI 5/24:  1.  Acute left pontine paramedian infarct.  2.  Multiple chronic lacunar infarcts.  3.  Moderate chronic microvascular ischemic disease.  4.  Moderate cerebral atrophy.    Restarting metformin  Discussed with RN and CM  Continue PT OT SLP    Consultants/Specialty  Neurology    Code Status  Full code    Disposition  Inpatient, continue on telemetry on neuro floor    Review of Systems  Review of Systems   Unable to perform ROS: Acuity of condition      Physical Exam  Temp:  [36.4 °C (97.6 °F)-37.1 °C (98.8 °F)] 36.6 °C (97.8 °F)  Pulse:  [50-66] 62  Resp:  [16-18] 18  BP: (149-160)/(70-79) 152/77  SpO2:  [96 %-98 %] 98 %    Physical Exam   Constitutional: He appears well-developed and well-nourished. No distress.   HENT:   Head: Normocephalic and atraumatic.   Mouth/Throat: Oropharynx is clear and moist.   Eyes: Conjunctivae and EOM are normal. Right eye exhibits no discharge. Left eye exhibits no discharge. No scleral icterus.   Neck: Normal range of motion. Neck supple.   Cardiovascular: Normal rate, normal heart sounds and intact distal pulses.    No murmur heard.  Pulmonary/Chest: Effort normal and breath sounds normal. No stridor. No respiratory  distress. He has no wheezes.   Abdominal: Soft. Bowel sounds are normal. He exhibits no distension. There is no tenderness. There is no rebound.   Musculoskeletal: He exhibits no edema or tenderness.   Neurological: He is alert.   Unable to assess orientation due to aphasia/confusion  Right upper extremity strength 3/5  Right lower extremity strength 4/5   Skin: Skin is warm and dry. No rash noted. He is not diaphoretic. No erythema. No pallor.   Psychiatric:   Confused   Nursing note and vitals reviewed.    Fluids    Intake/Output Summary (Last 24 hours) at 05/26/19 1116  Last data filed at 05/26/19 0600   Gross per 24 hour   Intake              830 ml   Output                0 ml   Net              830 ml     Laboratory  Recent Labs      05/24/19 1807 05/25/19 0215 05/26/19   0459   WBC  6.1  7.3  7.7   RBC  5.65  5.26  5.20   HEMOGLOBIN  16.0  14.9  15.0   HEMATOCRIT  49.0  46.5  44.8   MCV  86.7  88.4  86.2   MCH  28.3  28.3  28.8   MCHC  32.7*  32.0*  33.5*   RDW  38.9  39.4  38.3   PLATELETCT  202  181  193   MPV  10.0  10.1  10.5     Recent Labs      05/24/19 1807 05/25/19 0215  05/26/19   0459   SODIUM  140  138  139   POTASSIUM  4.0  4.8  3.6   CHLORIDE  103  105  103   CO2  29  23  25   GLUCOSE  113*  86  117*   BUN  21  18  25*   CREATININE  1.06  0.89  0.96   CALCIUM  9.4  7.8*  9.2     Recent Labs      05/24/19 1807   APTT  29.9   INR  0.99         Recent Labs      05/25/19 0215   TRIGLYCERIDE  72   HDL  29*   LDL  43     Imaging  EC-ECHOCARDIOGRAM COMPLETE W/O CONT   Final Result      MR-BRAIN-W/O   Final Result      1.  Acute left pontine paramedian infarct.   2.  Multiple chronic lacunar infarcts.   3.  Moderate chronic microvascular ischemic disease.   4.  Moderate cerebral atrophy.      DX-CHEST-PORTABLE (1 VIEW)   Final Result      No acute cardiac or pulmonary abnormality is noted.      CT-CTA HEAD WITH & W/O-POST PROCESS   Final Result      No large vessel occlusion, high-grade  stenosis or aneurysm of the [place].      CT-CTA NECK WITH & W/O-POST PROCESSING   Final Result      CT angiogram of the neck within normal limits.      CT-HEAD W/O   Final Result      1.  No acute intracranial process.      2.  Atrophy and small vessel ischemic change.      CT-CEREBRAL PERFUSION ANALYSIS   Final Result      1.  Cerebral blood flow less than 30% likely representing completed infarct = 0 mL.      2.  T Max more than 6 seconds likely representing combination of completed infarct and ischemia = 0 mL.      3.  Mismatched volume likely representing ischemic brain/penumbra = None      4.  Please note that the cerebral perfusion was performed on the limited brain tissue around the basal ganglia region. Infarct/ischemia outside the CT perfusion sections can be missed in this study.           Assessment/Plan  * CVA (cerebral vascular accident) ([facility])   Assessment & Plan    Outside of window no TPA given   NIH 4 on arrival  Continue neuro checks  Allow for permissive HTN through 5/26  PT/OT/Speech evaluation    MRI brain shows  1.  Acute left pontine paramedian infarct.  2.  Multiple chronic lacunar infarcts.  3.  Moderate chronic microvascular ischemic disease.  4.  Moderate cerebral atrophy.    ECHO unremarkable  Continue tele  Asa, statin continues  A1c 7.4%  Lipid panel okay  Restart metformin     DM (diabetes mellitus) (HCC)   Assessment & Plan    SSI ordered  Restart metformin, DM2 poorly controlled with hyperglycemia   a1c 7.4%       HTN (hypertension)   Assessment & Plan    Allowing for permissive htn   Hold home meds for now        VTE prophylaxis: SCDs, heparin q12

## 2019-05-27 PROCEDURE — 99232 SBSQ HOSP IP/OBS MODERATE 35: CPT | Performed by: HOSPITALIST

## 2019-05-27 PROCEDURE — 700111 HCHG RX REV CODE 636 W/ 250 OVERRIDE (IP): Performed by: HOSPITALIST

## 2019-05-27 PROCEDURE — 97530 THERAPEUTIC ACTIVITIES: CPT

## 2019-05-27 PROCEDURE — A9270 NON-COVERED ITEM OR SERVICE: HCPCS | Performed by: HOSPITALIST

## 2019-05-27 PROCEDURE — 770020 HCHG ROOM/CARE - TELE (206)

## 2019-05-27 PROCEDURE — 700102 HCHG RX REV CODE 250 W/ 637 OVERRIDE(OP): Performed by: HOSPITALIST

## 2019-05-27 PROCEDURE — 97535 SELF CARE MNGMENT TRAINING: CPT

## 2019-05-27 RX ORDER — LOSARTAN POTASSIUM 50 MG/1
25 TABLET ORAL DAILY
Status: DISCONTINUED | OUTPATIENT
Start: 2019-05-27 | End: 2019-05-30 | Stop reason: HOSPADM

## 2019-05-27 RX ADMIN — METFORMIN HYDROCHLORIDE 1000 MG: 500 TABLET, FILM COATED ORAL at 17:20

## 2019-05-27 RX ADMIN — LOSARTAN POTASSIUM 25 MG: 50 TABLET ORAL at 17:19

## 2019-05-27 RX ADMIN — HEPARIN SODIUM 5000 UNITS: 5000 INJECTION, SOLUTION INTRAVENOUS; SUBCUTANEOUS at 12:16

## 2019-05-27 RX ADMIN — ASPIRIN 325 MG: 325 TABLET, FILM COATED ORAL at 17:21

## 2019-05-27 RX ADMIN — HEPARIN SODIUM 5000 UNITS: 5000 INJECTION, SOLUTION INTRAVENOUS; SUBCUTANEOUS at 21:52

## 2019-05-27 RX ADMIN — METFORMIN HYDROCHLORIDE 1000 MG: 500 TABLET, FILM COATED ORAL at 06:15

## 2019-05-27 RX ADMIN — ATORVASTATIN CALCIUM 80 MG: 80 TABLET, FILM COATED ORAL at 17:21

## 2019-05-27 ASSESSMENT — COGNITIVE AND FUNCTIONAL STATUS - GENERAL
TOILETING: A LITTLE
PERSONAL GROOMING: A LITTLE
DRESSING REGULAR UPPER BODY CLOTHING: A LITTLE
DRESSING REGULAR LOWER BODY CLOTHING: A LITTLE
HELP NEEDED FOR BATHING: A LITTLE
DAILY ACTIVITIY SCORE: 19
SUGGESTED CMS G CODE MODIFIER DAILY ACTIVITY: CK

## 2019-05-27 NOTE — CARE PLAN
Problem: Bowel/Gastric:  Goal: Normal bowel function is maintained or improved  Outcome: PROGRESSING AS EXPECTED  Pt taking stool softeners, having regular BMs    Problem: Discharge Barriers/Planning  Goal: Patient's continuum of care needs will be met  Outcome: PROGRESSING AS EXPECTED   working with pt and family to determine safe d/c plan

## 2019-05-27 NOTE — DISCHARGE PLANNING
Anticipated Discharge Disposition:   TBD-possible need for IRF    Action:   Met with son. Assessment completed.   Pt works at [employer] . He is normally independent.   Email sent to [facility] to screen pt for Medicaid.     Barriers to Discharge:   Medical clearance  Medicaid pending.     Plan:   Follow up with [facility].

## 2019-05-27 NOTE — DISCHARGE PLANNING
Care Transition Team Assessment    Information Source  Orientation : Oriented x 4  Who is responsible for making decisions for patient? : Patient         Elopement Risk  Legal Hold: No  Ambulatory or Self Mobile in Wheelchair: Yes  Disoriented: No  Psychiatric Symptoms: None  History of Wandering: No  Elopement this Admit: No  Vocalizing Wanting to Leave: No  Displays Behaviors, Body Language Wanting to Leave: No-Not at Risk for Elopement  Elopement Risk: Not at Risk for Elopement    Interdisciplinary Discharge Planning  Does Admitting Nurse Feel This Could be a Complex Discharge?: No  Primary Care Physician: Dr. Medina.   Lives with - Patient's Self Care Capacity: Spouse, Adult Children  Patient or legal guardian wants to designate a caregiver (see row info): No  Support Systems: Children, Spouse / Significant Other  Housing / Facility: 2 Story Apartment / Condo (has stairs and elevator)  Do You Take your Prescribed Medications Regularly: No  Able to Return to Previous ADL's: Future Time w/Therapy  Mobility Issues: Yes  Prior Services: None, Home-Independent  Patient Expects to be Discharged to:: TBD  Assistance Needed: Yes  Durable Medical Equipment: Not Applicable    Discharge Preparedness  What is your plan after discharge?: Uncertain - pending medical team collaboration  What are your discharge supports?: Child, Spouse  Prior Functional Level: Ambulatory, Independent with Activities of Daily Living, Independent with Medication Management  Difficulity with ADLs: Bathing, Toileting, Walking  Difficulity with IADLs: Cooking, Driving, Laundry    Functional Assesment  Prior Functional Level: Ambulatory, Independent with Activities of Daily Living, Independent with Medication Management    Finances  Financial Barriers to Discharge: No  Prescription Coverage: No                   Domestic Abuse  Have you ever been the victim of abuse or violence?: No    Psychological Assessment  History of Substance Abuse:  None    Discharge Risks or Barriers  Discharge risks or barriers?: Uninsured / underinsured, Medicaid pending    Anticipated Discharge Information  Anticipated discharge disposition: Home

## 2019-05-27 NOTE — DIETARY
Nutrition Services:    Poor PO intake/ Unintentional Weight Loss noted on Nutrition Admit Screen. Pt is currently on a Regular/ Dysphagia 3/ Thin Liquid diet and per chart pt PO % of all meals. Met with pt at bedside, he denied poor PO/ weight loss. Pt appears adequately nourished.     Ht: 175.3 cm, Wt: 65 kg, BMI 21.2 (WNL).    Consult RD as needed. RD will re-screen weekly, available prn

## 2019-05-27 NOTE — PROGRESS NOTES
Hospital Medicine Daily Progress Note    Date of Service  5/27/2019    Chief Complaint  63 y.o. male admitted 5/24/2019 with speech changes and right-sided weakness    Hospital Course    63 y.o. male who presented 5/24/2019 with past medical history of diabetes, hypertension who presents with changes in speech and right-sided leg weakness.    He was outside the window for any TPA.  CT noncontrast negative for acute CVA and CTA head and neck negative for aneurysm or stenosis.       Interval Problem Update  Seen and examined this morning, family not in room this morning  Still having speech difficulties, right hand and arm weakness, right lower extremity improving a little bit but exam more challenging due to language barriers, family was helpful before   Lungs clear, patient regular rhythm, no other complaints from patient  Eating all of his meals    MRI 5/24:  1.  Acute left pontine paramedian infarct.  2.  Multiple chronic lacunar infarcts.  3.  Moderate chronic microvascular ischemic disease.  4.  Moderate cerebral atrophy.    Restarted metformin  Discussed with RN and CM  Continue PT OT SLP, likely needs SNF, referral placed    Consultants/Specialty  Neurology    Code Status  Full code    Disposition  Inpatient, continue on telemetry on neuro floor, expect SNF placement    Review of Systems  Review of Systems   Unable to perform ROS: Other    Language barrier  Physical Exam  Temp:  [36.7 °C (98 °F)-37.2 °C (98.9 °F)] 36.7 °C (98 °F)  Pulse:  [56-72] 57  Resp:  [16-18] 16  BP: (147-159)/(70-84) 147/75  SpO2:  [95 %-99 %] 97 %    Physical Exam   Constitutional: He appears well-developed and well-nourished. No distress.   HENT:   Head: Normocephalic and atraumatic.   Mouth/Throat: Oropharynx is clear and moist.   Eyes: Conjunctivae and EOM are normal. Right eye exhibits no discharge. Left eye exhibits no discharge. No scleral icterus.   Neck: Normal range of motion. Neck supple.   Cardiovascular: Normal rate, normal  heart sounds and intact distal pulses.    No murmur heard.  Pulmonary/Chest: Effort normal and breath sounds normal. No stridor. No respiratory distress. He has no wheezes.   Abdominal: Soft. Bowel sounds are normal. He exhibits no distension. There is no tenderness. There is no rebound.   Musculoskeletal: He exhibits no edema or tenderness.   Neurological: He is alert.   Seems more alert now, not as lethargic/confused  Right upper extremity strength 3/5  Right lower extremity strength 4/5   Skin: Skin is warm and dry. No rash noted. He is not diaphoretic. No erythema. No pallor.   Nursing note and vitals reviewed.    Fluids  No intake or output data in the 24 hours ending 05/27/19 1319  Laboratory  Recent Labs      05/24/19 1807 05/25/19 0215 05/26/19   0459   WBC  6.1  7.3  7.7   RBC  5.65  5.26  5.20   HEMOGLOBIN  16.0  14.9  15.0   HEMATOCRIT  49.0  46.5  44.8   MCV  86.7  88.4  86.2   MCH  28.3  28.3  28.8   MCHC  32.7*  32.0*  33.5*   RDW  38.9  39.4  38.3   PLATELETCT  202  181  193   MPV  10.0  10.1  10.5     Recent Labs      05/24/19 1807 05/25/19 0215  05/26/19   0459   SODIUM  140  138  139   POTASSIUM  4.0  4.8  3.6   CHLORIDE  103  105  103   CO2  29  23  25   GLUCOSE  113*  86  117*   BUN  21  18  25*   CREATININE  1.06  0.89  0.96   CALCIUM  9.4  7.8*  9.2     Recent Labs      05/24/19 1807   APTT  29.9   INR  0.99         Recent Labs      05/25/19 0215   TRIGLYCERIDE  72   HDL  29*   LDL  43     Imaging  EC-ECHOCARDIOGRAM COMPLETE W/O CONT   Final Result      MR-BRAIN-W/O   Final Result      1.  Acute left pontine paramedian infarct.   2.  Multiple chronic lacunar infarcts.   3.  Moderate chronic microvascular ischemic disease.   4.  Moderate cerebral atrophy.      DX-CHEST-PORTABLE (1 VIEW)   Final Result      No acute cardiac or pulmonary abnormality is noted.      CT-CTA HEAD WITH & W/O-POST PROCESS   Final Result      No large vessel occlusion, high-grade stenosis or aneurysm of  the [place].      CT-CTA NECK WITH & W/O-POST PROCESSING   Final Result      CT angiogram of the neck within normal limits.      CT-HEAD W/O   Final Result      1.  No acute intracranial process.      2.  Atrophy and small vessel ischemic change.      CT-CEREBRAL PERFUSION ANALYSIS   Final Result      1.  Cerebral blood flow less than 30% likely representing completed infarct = 0 mL.      2.  T Max more than 6 seconds likely representing combination of completed infarct and ischemia = 0 mL.      3.  Mismatched volume likely representing ischemic brain/penumbra = None      4.  Please note that the cerebral perfusion was performed on the limited brain tissue around the basal ganglia region. Infarct/ischemia outside the CT perfusion sections can be missed in this study.           Assessment/Plan  * CVA (cerebral vascular accident) ([facility])   Assessment & Plan    Outside of window no TPA given   NIH 4 on arrival  Continue neuro checks  Allow for permissive HTN through 5/26    MRI brain shows  1.  Acute left pontine paramedian infarct.  2.  Multiple chronic lacunar infarcts.  3.  Moderate chronic microvascular ischemic disease.  4.  Moderate cerebral atrophy.    ECHO unremarkable  Continue tele  Asa, statin continues  A1c 7.4%  Lipid panel okay  Restart metformin  PT/OT/Speech   Expect SNF     DM (diabetes mellitus) ([facility])   Assessment & Plan    SSI ordered  Restart metformin, DM2 poorly controlled with hyperglycemia   a1c 7.4%     HTN (hypertension)   Assessment & Plan    permissive htn on admission  Will start to add back home HTN medications         VTE prophylaxis: SCDs, heparin q12

## 2019-05-27 NOTE — PROGRESS NOTES
Bedside report given by day SUSANNE Maravilla. Assumed care at 1900. Pt A&Ox4. Reporting no pain. Family at bedside. MESSI HOGUE, denies n/v. IV fluids running. POC discussed. Pt resting comfortably, treaded socks in place, bed locked and in lowest position, bed alarm on, call light in reach, hourly rounding in place.

## 2019-05-27 NOTE — PROGRESS NOTES
Telemetry Summary  Rhythm:  Sinus rhythm  HR: 66-85  VA:  0.20  QRS:  0.04  QT:  0.36  Ectopy:  NA

## 2019-05-28 ENCOUNTER — HOSPITAL ENCOUNTER (INPATIENT)
Facility: REHABILITATION | Age: 64
End: 2019-05-28
Attending: PHYSICAL MEDICINE & REHABILITATION | Admitting: PHYSICAL MEDICINE & REHABILITATION

## 2019-05-28 PROCEDURE — 700102 HCHG RX REV CODE 250 W/ 637 OVERRIDE(OP): Performed by: HOSPITALIST

## 2019-05-28 PROCEDURE — A9270 NON-COVERED ITEM OR SERVICE: HCPCS | Performed by: HOSPITALIST

## 2019-05-28 PROCEDURE — 97530 THERAPEUTIC ACTIVITIES: CPT

## 2019-05-28 PROCEDURE — 700111 HCHG RX REV CODE 636 W/ 250 OVERRIDE (IP): Performed by: HOSPITALIST

## 2019-05-28 PROCEDURE — 770020 HCHG ROOM/CARE - TELE (206)

## 2019-05-28 PROCEDURE — 97116 GAIT TRAINING THERAPY: CPT

## 2019-05-28 PROCEDURE — 99232 SBSQ HOSP IP/OBS MODERATE 35: CPT | Performed by: HOSPITALIST

## 2019-05-28 RX ADMIN — SENNOSIDES,DOCUSATE SODIUM 2 TABLET: 8.6; 5 TABLET, FILM COATED ORAL at 17:00

## 2019-05-28 RX ADMIN — ASPIRIN 325 MG: 325 TABLET, FILM COATED ORAL at 16:59

## 2019-05-28 RX ADMIN — MAGNESIUM HYDROXIDE 30 ML: 400 SUSPENSION ORAL at 05:41

## 2019-05-28 RX ADMIN — LOSARTAN POTASSIUM 25 MG: 50 TABLET ORAL at 05:39

## 2019-05-28 RX ADMIN — METFORMIN HYDROCHLORIDE 1000 MG: 500 TABLET, FILM COATED ORAL at 18:00

## 2019-05-28 RX ADMIN — HEPARIN SODIUM 5000 UNITS: 5000 INJECTION, SOLUTION INTRAVENOUS; SUBCUTANEOUS at 10:14

## 2019-05-28 RX ADMIN — METFORMIN HYDROCHLORIDE 1000 MG: 500 TABLET, FILM COATED ORAL at 05:39

## 2019-05-28 RX ADMIN — HEPARIN SODIUM 5000 UNITS: 5000 INJECTION, SOLUTION INTRAVENOUS; SUBCUTANEOUS at 21:39

## 2019-05-28 RX ADMIN — ATORVASTATIN CALCIUM 80 MG: 80 TABLET, FILM COATED ORAL at 17:00

## 2019-05-28 RX ADMIN — SENNOSIDES,DOCUSATE SODIUM 2 TABLET: 8.6; 5 TABLET, FILM COATED ORAL at 05:39

## 2019-05-28 ASSESSMENT — ENCOUNTER SYMPTOMS
FEVER: 0
DIAPHORESIS: 0
FOCAL WEAKNESS: 0
PSYCHIATRIC NEGATIVE: 1
MUSCULOSKELETAL NEGATIVE: 1
SORE THROAT: 0
CONSTITUTIONAL NEGATIVE: 1
CHILLS: 0
PALPITATIONS: 0
GASTROINTESTINAL NEGATIVE: 1
WEAKNESS: 0
BLURRED VISION: 0
HEADACHES: 0
EYES NEGATIVE: 1
NAUSEA: 0
BRUISES/BLEEDS EASILY: 0
RESPIRATORY NEGATIVE: 1
DEPRESSION: 0
MYALGIAS: 0
CARDIOVASCULAR NEGATIVE: 1
SHORTNESS OF BREATH: 0
NEUROLOGICAL NEGATIVE: 1
DIZZINESS: 0
ABDOMINAL PAIN: 0
VOMITING: 0
WEIGHT LOSS: 0
COUGH: 0

## 2019-05-28 ASSESSMENT — GAIT ASSESSMENTS
GAIT LEVEL OF ASSIST: SUPERVISED
ASSISTIVE DEVICE: FRONT WHEEL WALKER
DEVIATION: DECREASED HEEL STRIKE;SHUFFLED GAIT
DISTANCE (FEET): 250

## 2019-05-28 ASSESSMENT — COGNITIVE AND FUNCTIONAL STATUS - GENERAL
WALKING IN HOSPITAL ROOM: A LITTLE
CLIMB 3 TO 5 STEPS WITH RAILING: A LITTLE
SUGGESTED CMS G CODE MODIFIER MOBILITY: CJ
MOBILITY SCORE: 22

## 2019-05-28 ASSESSMENT — LIFESTYLE VARIABLES: SUBSTANCE_ABUSE: 0

## 2019-05-28 ASSESSMENT — PATIENT HEALTH QUESTIONNAIRE - PHQ9
1. LITTLE INTEREST OR PLEASURE IN DOING THINGS: NOT AT ALL
2. FEELING DOWN, DEPRESSED, IRRITABLE, OR HOPELESS: NOT AT ALL
SUM OF ALL RESPONSES TO PHQ9 QUESTIONS 1 AND 2: 0

## 2019-05-28 NOTE — CONSULTS
Medical chart review completed.     Patient is a 63 y.o. male  with a past medical history of diabetes and hypertension, admitted to [facility] on 5/24/2019, with right sided weakness and slurred speech. Negative Head CT, CTA, and perfusion scan. NIHSS 4. Patient outside window for tPA. MRI with left paramedian pontine infarctions. HgbA1c 7.4, LDL 43, ECHO EF 65 %. Patient not seen by neurology.     Patient with multiple co-morbidities(including but not limited to hypertension, diabetes); with cognitive deficits and functional deficits in mobility/self-cares/swallowing/speech, and Moderate de-conditioning.     Pre-morbidly, this patient lived in a two level home with no steps to enter, with his son and wife. The patient was evaluated by acute care Physical Therapy, Occupational Therapy and Speech Language Pathology; currently requiring minimal assistance for mobility and minimal assistance for ADLs, also with ongoing swallowing deficits.     6 clicks score 20 mobility, 19 ADLs    If family is able to provide 24/7 at discharge, he is an excellent candidate for a short stay of acute inpatient rehabilitation program with a coordinated program of care at an intensity and frequency not available at a lower level of care.     Note: if the patient continues to progress while waiting for medical clearance, and no longer requires 2 out of 3 therapy services (PT/OT/SLP), then the patient would no longer meet the criteria for acute inpatient rehabilitation.    This recommendation is substantiated by the patient's current medical condition with intervention and assessment of medical issues requiring an acute level of care for patient's safety and maximum outcome. A coordinated program of care will be provided by an interdisciplinary team including physical therapy, occupational therapy, speech language pathology, hospitalist, physiatry, rehab nursing and rehab psychology. Rehab goals include improved swallowing,  mobility, self-care management, strength and conditioning/endurance, pain management, bowel and bladder management, mood and affect, and safety with independent home management including caregiver training. Estimated length of stay is approximately 10-12 days. Rehab potential: Very Good. Disposition: to pre-morbid independent living setting with supportive care of patient's family. We will continue to follow with you in anticipation of discharge to acute inpatient rehabilitation when medically stable to do so at the discretion of his attending physician.     Thank you for allowing us to participate in his care.    Breana Carrington M.D.  Physical Medicine and Rehabilitation

## 2019-05-28 NOTE — PROGRESS NOTES
Telemetry Summary  Rhythm:  Sinus rhythm/sinus bradycardia  HR:  51-66  CA:  0.16  QRS:  0.12  QT:  0.36  Ectopy:  NA

## 2019-05-28 NOTE — THERAPY
Speech Therapy Contact Note:    Attempted to see pt for dysphagia tx, however pt declining breakfast tray or PO trials as SO brought in food from home.  RN aware.  SLP will attempt to see pt again later when he is willing.

## 2019-05-28 NOTE — DISCHARGE PLANNING
Anticipated Discharge Disposition: IRF    Action: RN CM spoke with patient's son via telephone.  Son Vijay states he works during the day and can be at bedside from 6 am-8:30 am, but his mother can stay with the patient 24/7.  Vijay also stated the patient can understand a little bit of English.     Barriers to Discharge: IRF acceptance  Medical Clearance    Plan: RN CM to await IRF acceptance and Medical clearance.

## 2019-05-28 NOTE — PROGRESS NOTES
Hospital Medicine Daily Progress Note    Date of Service  5/28/2019    Chief Complaint  63 y.o. male admitted 5/24/2019 with speech changes and right-sided weakness    Hospital Course     Patient is a 63 y.o. male  with past medical history of diabetes, hypertension who presented with changes in speech and right-sided leg weakness.    He was outside the window for any TPA.  CT noncontrast negative for acute CVA and CTA head and neck negative for aneurysm or stenosis.       Interval Problem Update  He is axox3  Denies pain  No sob  Ros otherwise negative    Consultants/Specialty  Neurology    Code Status  Full code    Disposition  Inpatient, continue on telemetry on neuro floor, expect SNF placement    Review of Systems  Review of Systems   Unable to perform ROS: Other   Constitutional: Negative.  Negative for chills, diaphoresis, fever, malaise/fatigue and weight loss.   HENT: Negative.  Negative for sore throat.    Eyes: Negative.  Negative for blurred vision.   Respiratory: Negative.  Negative for cough and shortness of breath.    Cardiovascular: Negative.  Negative for chest pain, palpitations and leg swelling.   Gastrointestinal: Negative.  Negative for abdominal pain, nausea and vomiting.   Genitourinary: Negative.  Negative for dysuria.   Musculoskeletal: Negative.  Negative for myalgias.   Skin: Negative.  Negative for itching and rash.   Neurological: Negative.  Negative for dizziness, focal weakness, weakness and headaches.   Endo/Heme/Allergies: Negative.  Does not bruise/bleed easily.   Psychiatric/Behavioral: Negative.  Negative for depression, substance abuse and suicidal ideas.   All other systems reviewed and are negative.   Language barrier  Physical Exam  Temp:  [36.3 °C (97.4 °F)-36.8 °C (98.2 °F)] 36.3 °C (97.4 °F)  Pulse:  [50-63] 50  Resp:  [16] 16  BP: (135-152)/(68-76) 135/68  SpO2:  [95 %-98 %] 95 %    Physical Exam   Constitutional: He appears well-developed and well-nourished. No distress.    HENT:   Head: Normocephalic and atraumatic.   Mouth/Throat: Oropharynx is clear and moist.   Eyes: Conjunctivae and EOM are normal. Right eye exhibits no discharge. Left eye exhibits no discharge. No scleral icterus.   Neck: Normal range of motion. Neck supple.   Cardiovascular: Normal rate, normal heart sounds and intact distal pulses.    No murmur heard.  Pulmonary/Chest: Effort normal and breath sounds normal. No stridor. No respiratory distress. He has no wheezes.   Abdominal: Soft. Bowel sounds are normal. He exhibits no distension. There is no tenderness. There is no rebound.   Musculoskeletal: He exhibits no edema or tenderness.   Neurological: He is alert.   Stable r sided weakness     Skin: Skin is warm and dry. No rash noted. He is not diaphoretic. No erythema. No pallor.   Nursing note and vitals reviewed.    Fluids    Intake/Output Summary (Last 24 hours) at 05/28/19 0999  Last data filed at 05/28/19 0600   Gross per 24 hour   Intake              240 ml   Output                0 ml   Net              240 ml     Laboratory  Recent Labs      05/26/19   0459   WBC  7.7   RBC  5.20   HEMOGLOBIN  15.0   HEMATOCRIT  44.8   MCV  86.2   MCH  28.8   MCHC  33.5*   RDW  38.3   PLATELETCT  193   MPV  10.5     Recent Labs      05/26/19   0459   SODIUM  139   POTASSIUM  3.6   CHLORIDE  103   CO2  25   GLUCOSE  117*   BUN  25*   CREATININE  0.96   CALCIUM  9.2                 Imaging  EC-ECHOCARDIOGRAM COMPLETE W/O CONT   Final Result      MR-BRAIN-W/O   Final Result      1.  Acute left pontine paramedian infarct.   2.  Multiple chronic lacunar infarcts.   3.  Moderate chronic microvascular ischemic disease.   4.  Moderate cerebral atrophy.      DX-CHEST-PORTABLE (1 VIEW)   Final Result      No acute cardiac or pulmonary abnormality is noted.      CT-CTA HEAD WITH & W/O-POST PROCESS   Final Result      No large vessel occlusion, high-grade stenosis or aneurysm of the [place].      CT-CTA NECK WITH & W/O-POST  PROCESSING   Final Result      CT angiogram of the neck within normal limits.      CT-HEAD W/O   Final Result      1.  No acute intracranial process.      2.  Atrophy and small vessel ischemic change.      CT-CEREBRAL PERFUSION ANALYSIS   Final Result      1.  Cerebral blood flow less than 30% likely representing completed infarct = 0 mL.      2.  T Max more than 6 seconds likely representing combination of completed infarct and ischemia = 0 mL.      3.  Mismatched volume likely representing ischemic brain/penumbra = None      4.  Please note that the cerebral perfusion was performed on the limited brain tissue around the basal ganglia region. Infarct/ischemia outside the CT perfusion sections can be missed in this study.           Assessment/Plan  * CVA (cerebral vascular accident) ([facility])   Assessment & Plan    Outside of window no TPA given   NIH 4 on arrival  Continue neuro checks  Allow for permissive HTN through 5/26    MRI brain shows  1.  Acute left pontine paramedian infarct.  2.  Multiple chronic lacunar infarcts.  3.  Moderate chronic microvascular ischemic disease.  4.  Moderate cerebral atrophy.    ECHO unremarkable  Continue tele  Asa, statin continues  A1c 7.4%  Lipid panel okay  Restart metformin  PT/OT/Speech   Expect SNF     DM (diabetes mellitus) ([facility])   Assessment & Plan    SSI ordered  Restart metformin, DM2 poorly controlled with hyperglycemia   a1c 7.4%     HTN (hypertension)   Assessment & Plan    permissive htn on admission  Will start to add back home HTN medications         VTE prophylaxis: SCDs, heparin q12

## 2019-05-28 NOTE — THERAPY
"Physical Therapy Treatment completed.   Bed Mobility:  Supine to Sit: Supervised  Transfers: Sit to Stand: Supervised  Gait: Level Of Assist: Supervised (needed Ernestine when not using FWW, HHA only. ) with Front-Wheel Walker       Plan of Care: Plan to complete next treatment by 5/29  Discharge Recommendations: Equipment: Front-Wheel Walker.   Today, pt shows improved participation in therapy, son present to translate explains that pt often does not understand why things are being done etc in a medical environment since he moved from [facility] in Dec. 2019. Pt shows a slow pace with ambulation, when cued to walk at a pace that feels comfortable, pt maintains slow pace, but son says that he normally moves along much faster than this. Pt did show more confidence when walking with FWW, does not always clear the R foot, but does not show bossman footdrop. Pt tolerated 250 feet using FWW with supervision, then back to room to don shoes and trial walking with only HHA. Pt showed narrow CHIP and less confident with HHA only, will benefit from continued use of FWW.  Pt lives on second floor with elevator, son says pt does not normally need to do stairs. Pt needs cues to use R hand during tasks like pulling up socks and donning shoes. Pt is progressing well, would need consistent family support if he were to d/c to an inpt rehab setting as he seems more comfortable with that. Otherwise, pt will have 24/7 support from family at home and would benefit from home PT/OT followed by outpatient neuro-based PT.   See \"Rehab Therapy-Acute\" Patient Summary Report for complete documentation.       "

## 2019-05-28 NOTE — PROGRESS NOTES
Received report from AM RN Asael, pt shows no changes from baseline, no distress, family at bedside, bed alarm on, call bell in reach. Will continue to monitor.

## 2019-05-28 NOTE — THERAPY
"Occupational Therapy Treatment completed with focus on ADLs, ADL transfers, patient education and upper extremity function.  Functional Status:  Min A toilet txf, Min A standing grooming, Min A LB dressing, Min A functional mobility w/ FWW  Plan of Care: Will benefit from Occupational Therapy 5 times per week  Discharge Recommendations:  Equipment Will Continue to Assess for Equipment Needs. Post-acute therapy Recommend inpatient transitional care services for continued occupational therapy services.     See \"Rehab Therapy-Acute\" Patient Summary Report for complete documentation.     Pt seen for OT tx session. Pt demo'd difficulty w/ reaching outside CHIP and w/ fine motor tasks when performing standing grooming. Pt very modest, required min A while he performed pant management and close SPV for the rest of toileting ADL. Red therablock given to pt as well as HEP to increase  strength in R hand. Pt demo'd impaired balance, functional mobility, R UE strength and coordination as well as poor safety awareness impacting functional independence. Pt requested to have friend translate. Will continue to follow for Acute OT services. Recommend post acute placement prior to DC home.   "

## 2019-05-29 ENCOUNTER — HOME HEALTH ADMISSION (OUTPATIENT)
Dept: HOME HEALTH SERVICES | Facility: HOME HEALTHCARE | Age: 64
End: 2019-05-29
Payer: COMMERCIAL

## 2019-05-29 LAB — GLUCOSE BLD-MCNC: 89 MG/DL (ref 65–99)

## 2019-05-29 PROCEDURE — 97530 THERAPEUTIC ACTIVITIES: CPT

## 2019-05-29 PROCEDURE — 700102 HCHG RX REV CODE 250 W/ 637 OVERRIDE(OP): Performed by: HOSPITALIST

## 2019-05-29 PROCEDURE — 97116 GAIT TRAINING THERAPY: CPT

## 2019-05-29 PROCEDURE — 97535 SELF CARE MNGMENT TRAINING: CPT

## 2019-05-29 PROCEDURE — 770020 HCHG ROOM/CARE - TELE (206)

## 2019-05-29 PROCEDURE — A9270 NON-COVERED ITEM OR SERVICE: HCPCS | Performed by: HOSPITALIST

## 2019-05-29 PROCEDURE — 99253 IP/OBS CNSLTJ NEW/EST LOW 45: CPT | Performed by: PSYCHIATRY & NEUROLOGY

## 2019-05-29 PROCEDURE — 82962 GLUCOSE BLOOD TEST: CPT

## 2019-05-29 PROCEDURE — 99232 SBSQ HOSP IP/OBS MODERATE 35: CPT | Performed by: HOSPITALIST

## 2019-05-29 PROCEDURE — 700111 HCHG RX REV CODE 636 W/ 250 OVERRIDE (IP): Performed by: HOSPITALIST

## 2019-05-29 RX ADMIN — METFORMIN HYDROCHLORIDE 1000 MG: 500 TABLET, FILM COATED ORAL at 06:00

## 2019-05-29 RX ADMIN — HEPARIN SODIUM 5000 UNITS: 5000 INJECTION, SOLUTION INTRAVENOUS; SUBCUTANEOUS at 10:19

## 2019-05-29 RX ADMIN — ATORVASTATIN CALCIUM 80 MG: 80 TABLET, FILM COATED ORAL at 17:07

## 2019-05-29 RX ADMIN — LOSARTAN POTASSIUM 25 MG: 50 TABLET ORAL at 06:34

## 2019-05-29 RX ADMIN — ASPIRIN 325 MG: 325 TABLET, FILM COATED ORAL at 17:02

## 2019-05-29 RX ADMIN — HEPARIN SODIUM 5000 UNITS: 5000 INJECTION, SOLUTION INTRAVENOUS; SUBCUTANEOUS at 21:31

## 2019-05-29 RX ADMIN — MAGNESIUM HYDROXIDE 30 ML: 400 SUSPENSION ORAL at 17:16

## 2019-05-29 RX ADMIN — SENNOSIDES,DOCUSATE SODIUM 2 TABLET: 8.6; 5 TABLET, FILM COATED ORAL at 06:35

## 2019-05-29 ASSESSMENT — COGNITIVE AND FUNCTIONAL STATUS - GENERAL
PERSONAL GROOMING: A LITTLE
DRESSING REGULAR LOWER BODY CLOTHING: A LITTLE
SUGGESTED CMS G CODE MODIFIER DAILY ACTIVITY: CK
SUGGESTED CMS G CODE MODIFIER MOBILITY: CH
TOILETING: A LITTLE
MOBILITY SCORE: 24
DAILY ACTIVITIY SCORE: 19
HELP NEEDED FOR BATHING: A LITTLE
DRESSING REGULAR UPPER BODY CLOTHING: A LITTLE

## 2019-05-29 ASSESSMENT — ENCOUNTER SYMPTOMS
FOCAL WEAKNESS: 0
MYALGIAS: 0
BRUISES/BLEEDS EASILY: 0
CONSTITUTIONAL NEGATIVE: 1
WEAKNESS: 0
SHORTNESS OF BREATH: 0
BLURRED VISION: 0
PALPITATIONS: 0
CARDIOVASCULAR NEGATIVE: 1
PSYCHIATRIC NEGATIVE: 1
COUGH: 0
RESPIRATORY NEGATIVE: 1
FEVER: 0
SORE THROAT: 0
HEADACHES: 0
GASTROINTESTINAL NEGATIVE: 1
WEIGHT LOSS: 0
CHILLS: 0
ABDOMINAL PAIN: 0
EYES NEGATIVE: 1
NEUROLOGICAL NEGATIVE: 1
VOMITING: 0
NAUSEA: 0
MUSCULOSKELETAL NEGATIVE: 1
DEPRESSION: 0
DIZZINESS: 0
DIAPHORESIS: 0

## 2019-05-29 ASSESSMENT — GAIT ASSESSMENTS
ASSISTIVE DEVICE: FRONT WHEEL WALKER
GAIT LEVEL OF ASSIST: SUPERVISED
DISTANCE (FEET): 300

## 2019-05-29 ASSESSMENT — LIFESTYLE VARIABLES: SUBSTANCE_ABUSE: 0

## 2019-05-29 NOTE — THERAPY
"Occupational Therapy Treatment completed with focus on ADLs, ADL transfers and upper extremity function.  Functional Status:  SPV standing grooming, toileting, toilet txf   Plan of Care: Will benefit from Occupational Therapy 5 times per week  Discharge Recommendations:  Equipment Will Continue to Assess for Equipment Needs. Post-acute therapy Discharge to home with outpatient or home health for additional skilled therapy services.    See \"Rehab Therapy-Acute\" Patient Summary Report for complete documentation.     Pt making good functional progress towards OT goals. Increased  strength in R UE noted today. Pt found in room performing HEP independently. Pt continues to require assist w/ LB dressing and FMC w/ R UE. Will continue to follow for Acute OT services. Recommend DC home w/ HH  "

## 2019-05-29 NOTE — DISCHARGE PLANNING
ATTN: Case Management  RE: Referral for Home Health    As of 05/29/19, we have accepted the Home Health referral for the patient listed above.    A [facility] clinician will be out to see the patient within 48 hours. If you have any questions or concerns regarding the patient’s transition to Home Health, please do not hesitate to contact us at [phone].      We look forward to collaborating with you,  [facility] Team

## 2019-05-29 NOTE — PROGRESS NOTES
RN MOBILITY NOTE     Surgery patient?: no  Date of surgery: n/a  Ambulated 50 ft on day of surgery? (N/A if today is not date of surgery): n/a  Number of times ambulated 50 feet or greater today: 2  Patient has been up to chair, edge of bed or HOB 90 degrees for all meals?: yes  Goal met? (goal is ambulating at least 50 feet 2 times on day shift, one time on night shift): yes  If patient did not meet mobility goal, why?:

## 2019-05-29 NOTE — DISCHARGE PLANNING
Anticipated Discharge Disposition: Home with Home health    Action: RN CM received notification patient is not a candidate for rehab. SUSANNE ALEGRIA notified son Vijay of rehab decision and the next step would be home health. Vijay in agreement with plan for home health and stated his father would accept. Choice for home health obtained from patient and faxed to [facility].  PCP (Jeri Perry PA-C) updated.    Barriers to Discharge: Home health acceptance    Plan: Await [facility] home health acceptance for discharge home.       Addendum:  1425-spoke with Maria Ines Caraballo in Patient access regarding patient screening for Medicaid as patient currently has no insurance on file.  Maria Ines stated she will contact [facility] to screen patient today.

## 2019-05-29 NOTE — THERAPY
"Physical Therapy Treatment completed.   Bed Mobility:  Supine to Sit: Supervised  Transfers: Sit to Stand: Supervised  Gait: Level Of Assist: Supervised with No Equipment Needed       Plan of Care: Patient with no further skilled PT needs in the acute care setting at this time  Discharge Recommendations: Equipment: Front-Wheel Walker. Post-acute therapy Discharge to home with outpatient or home health for additional skilled therapy services.    PT goals have been met.  Pt is able to move in/out of bed w/o assist.  He is able to ambulate w/ fww 300 ft w/o loss of balance and w/o need for physical assist.  Today, he is able to also perform stairs w/o assist.  He did ambulate a short distance w/o the fww, also w/ spv, however, gait became more tentative.  At this time, PT would recommend using a fww for d/c.  Anticipate that his gait will progress towards indep w/o AD w/ time and repetition, as he has demonstrated such progress.  No acute PT needs at this time.  Apparently, he will have family assist at d/c.  Functionally, he is able to mobilize w/o need for assist.  PT will no longer follow.     See \"Rehab Therapy-Acute\" Patient Summary Report for complete documentation.       "

## 2019-05-29 NOTE — PROGRESS NOTES
1855- report given by night RN (Asael), POC discussed. pt received awake and alert in bed, breathing normally, bed alarm on, pt wife at bedside, no pain reported, SCD's on.    2130- pt asleep in bed, easily awoken, wife at bedside, pt breathing normally, no complaints of pain.

## 2019-05-29 NOTE — CONSULTS
Chief Complaint   Patient presents with   • Possible Stroke     Family reports symptoms to have started around 1000 with right sides weakness/unsteady giat and slurred speech that progressively was worse around 1600.    • Slurred Speech   • Unsteady Gait   • Weakness       Problem List Items Addressed This Visit     * (Principal)CVA (cerebral vascular accident) ([facility])     Outside of window no TPA given   NIH 4 on arrival  Continue neuro checks  Allow for permissive HTN through 5/26  Acute left pontine infarct  Continue asa and statin  Continue therapies  Snf/ rehab acceptance pending         Relevant Medications    labetalol (NORMODYNE,TRANDATE) injection 10 mg    hydrALAZINE (APRESOLINE) injection 10 mg    atorvastatin (LIPITOR) tablet 80 mg    losartan (COZAAR) 25 MG Tab    atorvastatin (LIPITOR) 40 MG Tab    heparin injection 5,000 Units    losartan (COZAAR) tablet 25 mg    Other Relevant Orders    REFERRAL TO SKILLED NURSING FACILITY    REFERRAL TO HOME HEALTH    HTN (hypertension)     Bp in stable range  Continue to follow         Relevant Medications    labetalol (NORMODYNE,TRANDATE) injection 10 mg    hydrALAZINE (APRESOLINE) injection 10 mg    atorvastatin (LIPITOR) tablet 80 mg    losartan (COZAAR) 25 MG Tab    atorvastatin (LIPITOR) 40 MG Tab    heparin injection 5,000 Units    losartan (COZAAR) tablet 25 mg    Other Relevant Orders    REFERRAL TO SKILLED NURSING FACILITY    DM (diabetes mellitus) ([facility])     Continue ssi and metformin           Relevant Medications    Canagliflozin (INVOKANA) 100 MG Tab    metformin (GLUCOPHAGE) 1000 MG tablet    metFORMIN (GLUCOPHAGE) tablet 1,000 mg    Other Relevant Orders    REFERRAL TO SKILLED NURSING FACILITY      Other Visit Diagnoses     Facial droop        Weakness        Dysarthria          Neurology Stroke Consultation     History of present illness:  This is a 63-year old male with PMHx significant for hypertension, diabetes mellitus who presented to [facility] on 5/24/19 for a chief complaint of RUE/RLE weakness and dysarthria witnessed by son. Patient was apparently in his usual state of health until approximately 1400 on 5/24; was witnessed by son to suddenly become weak on the Right with slurred speech. Family waited thinking symptoms would improve; when they did not, patient was brought to ED. At time of presentation here, NIHSS 4-5; CT Brain revealed no acute intracranial abnormality.  CTA without LVO. patient however determined not to be a candidate for IV tPA secondary to presentation time greater than 4.5 hours from time of symptom onset. Follow up MRI Brain has revealed small acute ischemic infarction to Left paramedian dior. Currently, patient is sitting up in bed; awake and alert. Still with mild RUE/RLE weakness/nunbness and slurred speech. No headache, dizziness, problems with vision or problem swallowing.     Neurology has been consulted by Dr. Elli Quispe to further evaluate the findings noted above.     Past medical history:   As noted above.     Past surgical history:   Non contributory.     Family history:   Non contributory.     Social history:   Social History     Social History   • Marital status:      Spouse name: N/A   • Number of children: N/A   • Years of education: N/A     Occupational History   • Not on file.     Social History Main Topics   • Smoking status: Never Smoker   • Smokeless tobacco: Never Used   • Alcohol use Not on file   • Drug use: Unknown   • Sexual activity: Not on file     Other Topics Concern   • Not on file     Social History Narrative   • No narrative on file       Current medications:   Current Facility-Administered Medications   Medication Dose   • losartan (COZAAR) tablet 25 mg  25 mg   • metFORMIN (GLUCOPHAGE) tablet 1,000 mg  1,000 mg   • acetaminophen (TYLENOL) tablet 650 mg  650 mg   • Influenza Vaccine Quad pf injection 0.5 mL  0.5 mL   • heparin injection 5,000 Units  5,000 Units   •  senna-docusate (PERICOLACE or SENOKOT S) 8.6-50 MG per tablet 2 Tab  2 Tab    And   • polyethylene glycol/lytes (MIRALAX) PACKET 1 Packet  1 Packet    And   • magnesium hydroxide (MILK OF MAGNESIA) suspension 30 mL  30 mL    And   • bisacodyl (DULCOLAX) suppository 10 mg  10 mg   • labetalol (NORMODYNE,TRANDATE) injection 10 mg  10 mg    Or   • hydrALAZINE (APRESOLINE) injection 10 mg  10 mg   • atorvastatin (LIPITOR) tablet 80 mg  80 mg   • aspirin (ASA) tablet 325 mg  325 mg    Or   • aspirin (ASA) chewable tab 324 mg  324 mg    Or   • aspirin (ASA) suppository 300 mg  300 mg       Medication Allergy:  No Known Allergies    Review of systems:   As noted above; otherwise all systems reviewed and determined to be negative.     Physical examination:   Vitals:    05/29/19 0633 05/29/19 0727 05/29/19 1200 05/29/19 1532   BP: 123/68 127/66 137/69 137/68   Pulse: (!) 55 63 61 (!) 58   Resp:  18 16 18   Temp:  36.7 °C (98.1 °F) 36.7 °C (98 °F) 36.7 °C (98 °F)   TempSrc:  Temporal     SpO2:  96% 97% 97%   Weight:       Height:         General: Patient in no acute distress, pleasant and cooperative.  HEENT: Normocephalic, no signs of acute trauma.   Neck: supple, no meningeal signs or carotid bruits. There is normal range of motion. No tenderness on exam.   Chest: clear to auscultation. No cough.   CV: RRR, no murmurs.   Skin: no signs of acute rashes or trauma.   Musculoskeletal: joints exhibit full range of motion, without any pain to palpation. There are no signs of joint or muscle swelling. There is no tenderness to deep palpation of muscles.   Psychiatric: No hallucinatory behavior. Denies symptoms of depression or suicidal ideation. Mood and affect appear normal on exam.     NEUROLOGICAL EXAM:   Mental status, orientation: Awake, alert and fully oriented.   Speech and language: speech is fluent, mild dysartria. The patient is able to name, repeat and comprehend.   Cranial nerve exam: Pupils are 3-4 mm bilaterally and  equally reactive to light and accommodation. Visual fields are intact by confrontation.  There is no nystagmus on primary or secondary gaze. Intact full EOM in all directions of gaze. Slight Right facial weakness appreciated by me. Sensation in the face is intact to light touch. Tongue is midline and without any signs of tongue biting or fasciculations. Sternocleidomastoid muscles exhibit is normal strength bilaterally. Shoulder shrug is intact bilaterally.   Motor exam: Strength is 5/5 in LUE/LLE, 4/5 to RUE, 4+/5 to RLE. Tone is normal. No abnormal movements were seen on exam.   Sensory exam reveals normal sense of light touch and pinprick in all extremities.   Deep tendon reflexes:  2+ throughout. Plantar responses are flexor. There is no clonus.   Coordination: shows a normal finger-nose-finger. Normal rapidly alternating movements.   Gait: Not assessed at this time as patient is a fall risk.       NIH Stroke Scale    1a Level of Consciousness   1b Orientation Questions   1c Response to Commands   2 Gaze   3 Visual Fields   4 Facial Movement 1  5 Motor Function (arm)   a Left   b Right 1  6 Motor Function (leg)   a Left   b Right   7 Limb Ataxia   8 Sensory 1  9 Language   10 Articulation 1  11 Extinction/Inattention     Score: 4    ANCILLARY DATA REVIEWED:     Lab Data Review:  No results found for this or any previous visit (from the past 24 hour(s)).    Labs reviewed by me.         Imaging reviewed by me:     EC-ECHOCARDIOGRAM COMPLETE W/O CONT   Final Result      MR-BRAIN-W/O   Final Result      1.  Acute left pontine paramedian infarct.   2.  Multiple chronic lacunar infarcts.   3.  Moderate chronic microvascular ischemic disease.   4.  Moderate cerebral atrophy.      DX-CHEST-PORTABLE (1 VIEW)   Final Result      No acute cardiac or pulmonary abnormality is noted.      CT-CTA HEAD WITH & W/O-POST PROCESS   Final Result      No large vessel occlusion, high-grade stenosis or aneurysm of the [place].       CT-CTA NECK WITH & W/O-POST PROCESSING   Final Result      CT angiogram of the neck within normal limits.      CT-HEAD W/O   Final Result      1.  No acute intracranial process.      2.  Atrophy and small vessel ischemic change.      CT-CEREBRAL PERFUSION ANALYSIS   Final Result      1.  Cerebral blood flow less than 30% likely representing completed infarct = 0 mL.      2.  T Max more than 6 seconds likely representing combination of completed infarct and ischemia = 0 mL.      3.  Mismatched volume likely representing ischemic brain/penumbra = None      4.  Please note that the cerebral perfusion was performed on the limited brain tissue around the basal ganglia region. Infarct/ischemia outside the CT perfusion sections can be missed in this study.          Presumed mechanism by TOAST:  __Large Artery Atherosclerosis  _X_Small Vessel (Lacunar)  __Cardioembolic  __Other (Sickle Cell, Vasculitis, Hypercoagulable)  __Unknown      ASSESSMENT AND PLAN:  63-year old male with PMHx significant for hypertension, diabetes mellitus who presented to [facility] on 5/24[facility] for a chief complaint of RUE/RLE weakness and dysarthria witnessed by son; not a candidate for IV tPA secondary to presentation time greater than 4.5 hours from time of symptom onset. MRI brain has revealed small acute ischemic infarction involving Left paramedian dior, likely small vessel etiology in setting of multiple vascular risk factors for stroke. NIHSS is currently 4.     Impression:   Small acute CVA involving Left paramedian dior. Likely lacunar etiology.   Hypertension  Diabetes mellitus.     Recommendations/Plan:     -q4h and PRN neuro assessment. VS per nursing, unit protocol. BP Goal 140/90.   -Telemetry; currently SR. Screen for Afib/arrhythmia. Note TTE with EF 65%, no gross structural abnormalities.   -Continue  mg PO q day and Atorvastatin 80 mg PO q HS. Note LDL 43.   -Recommend aggressive BG management per primary team.  Avoid IVF with Dextrose. A1c is 7.4, further management per primary team.   -PT/OT/SLP eval and treat.   - family regarding lifestyle and risk factor modification for secondary stroke prevention.   -All other medical management per primary team.   -DVT Prophylaxis: SCDs.     The plan of care above has been discussed with Dr. Ancelmo Westbrook MSN, BSN, AGNP-C  [facility]

## 2019-05-29 NOTE — CARE PLAN
Problem: Infection  Goal: Will remain free from infection  Outcome: PROGRESSING AS EXPECTED  No s/s of infection at this time, will cont to monitor, hand hygiene education provided to /wife

## 2019-05-29 NOTE — DISCHARGE PLANNING
Agency/Facility Name: [facility]  Plan or Request: Home health order and F2F completed. Referral sent to [facility].

## 2019-05-29 NOTE — FACE TO FACE
Face to Face Supporting Documentation - Home Health    The encounter with this patient was in whole or in part the primary reason for home health admission.    Date of encounter:   Patient:                    MRN:                       YOB: 2018  Delbert Montague  [id number]  1955     Home health to see patient for:  Skilled Nursing care for assessment, interventions & education    Skilled need for:  New Onset Medical Diagnosis stroke    Skilled nursing interventions to include:  Comment: exam, vitals PT/ OT    Homebound status evidenced by:  Needs the assistance of another person in order to leave the home. Leaving home requires a considerable and taxing effort. There is a normal inability to leave the home.    Community Physician to provide follow up care: Jeri Medina P.A.-C.     Optional Interventions? No      I certify the face to face encounter for this home health care referral meets the CMS requirements and the encounter/clinical assessment with the patient was, in whole, or in part, for the medical condition(s) listed above, which is the primary reason for home health care. Based on my clinical findings: the service(s) are medically necessary, support the need for home health care, and the homebound criteria are met.  I certify that this patient has had a face to face encounter by myself.  Harper Jay M.D. - NPI: [id number]

## 2019-05-29 NOTE — PROGRESS NOTES
[facility] Medicine Daily Progress Note    Date of Service  5/29/2019    Chief Complaint  63 y.o. male admitted 5/24/2019 with speech changes and right-sided weakness    Hospital Course     Patient is a 63 y.o. male  with past medical history of diabetes, hypertension who presented with changes in speech and right-sided leg weakness.    He was outside the window for any TPA.  CT noncontrast negative for acute CVA and CTA head and neck negative for aneurysm or stenosis.       Interval Problem Update  He remains axox3  Denies pain  No sob  Ros otherwise negative  Awaiting rehab /snf  Continue pt/ot    Consultants/Specialty  Neurology    Code Status  Full code    Disposition  Inpatient, continue on telemetry on neuro floor, expect SNF placement    Review of Systems  Review of Systems   Unable to perform ROS: Other   Constitutional: Negative.  Negative for chills, diaphoresis, fever, malaise/fatigue and weight loss.   HENT: Negative.  Negative for sore throat.    Eyes: Negative.  Negative for blurred vision.   Respiratory: Negative.  Negative for cough and shortness of breath.    Cardiovascular: Negative.  Negative for chest pain, palpitations and leg swelling.   Gastrointestinal: Negative.  Negative for abdominal pain, nausea and vomiting.   Genitourinary: Negative.  Negative for dysuria.   Musculoskeletal: Negative.  Negative for myalgias.   Skin: Negative.  Negative for itching and rash.   Neurological: Negative.  Negative for dizziness, focal weakness, weakness and headaches.   Endo/Heme/Allergies: Negative.  Does not bruise/bleed easily.   Psychiatric/Behavioral: Negative.  Negative for depression, substance abuse and suicidal ideas.   All other systems reviewed and are negative.   Language barrier  Physical Exam  Temp:  [36.4 °C (97.5 °F)-37.1 °C (98.7 °F)] 36.7 °C (98.1 °F)  Pulse:  [50-66] 63  Resp:  [16-18] 18  BP: (123-152)/(64-81) 127/66  SpO2:  [95 %-98 %] 96 %    Physical Exam   Constitutional: He appears  well-developed and well-nourished. No distress.   HENT:   Head: Normocephalic and atraumatic.   Mouth/Throat: Oropharynx is clear and moist.   Eyes: Conjunctivae and EOM are normal. Right eye exhibits no discharge. Left eye exhibits no discharge. No scleral icterus.   Neck: Normal range of motion. Neck supple.   Cardiovascular: Normal rate, normal heart sounds and intact distal pulses.    No murmur heard.  Pulmonary/Chest: Effort normal and breath sounds normal. No stridor. No respiratory distress. He has no wheezes.   Abdominal: Soft. Bowel sounds are normal. He exhibits no distension. There is no tenderness. There is no rebound.   Musculoskeletal: He exhibits no edema or tenderness.   Neurological: He is alert.   Stable r sided weakness     Skin: Skin is warm and dry. No rash noted. He is not diaphoretic. No erythema. No pallor.   Nursing note and vitals reviewed.    Fluids  No intake or output data in the 24 hours ending 05/29/19 0918  Laboratory                      Imaging  EC-ECHOCARDIOGRAM COMPLETE W/O CONT   Final Result      MR-BRAIN-W/O   Final Result      1.  Acute left pontine paramedian infarct.   2.  Multiple chronic lacunar infarcts.   3.  Moderate chronic microvascular ischemic disease.   4.  Moderate cerebral atrophy.      DX-CHEST-PORTABLE (1 VIEW)   Final Result      No acute cardiac or pulmonary abnormality is noted.      CT-CTA HEAD WITH & W/O-POST PROCESS   Final Result      No large vessel occlusion, high-grade stenosis or aneurysm of the [place].      CT-CTA NECK WITH & W/O-POST PROCESSING   Final Result      CT angiogram of the neck within normal limits.      CT-HEAD W/O   Final Result      1.  No acute intracranial process.      2.  Atrophy and small vessel ischemic change.      CT-CEREBRAL PERFUSION ANALYSIS   Final Result      1.  Cerebral blood flow less than 30% likely representing completed infarct = 0 mL.      2.  T Max more than 6 seconds likely representing combination of  completed infarct and ischemia = 0 mL.      3.  Mismatched volume likely representing ischemic brain/penumbra = None      4.  Please note that the cerebral perfusion was performed on the limited brain tissue around the basal ganglia region. Infarct/ischemia outside the CT perfusion sections can be missed in this study.           Assessment/Plan  * CVA (cerebral vascular accident) (formerly [facility])   Assessment & Plan    Outside of window no TPA given   NIH 4 on arrival  Continue neuro checks  Allow for permissive HTN through 5/26  Acute left pontine infarct  Continue asa and statin  Continue therapies  Snf/ rehab acceptance pending     DM (diabetes mellitus) (formerly [facility])   Assessment & Plan    Continue ssi and metformin       HTN (hypertension)   Assessment & Plan    Bp in stable range  Continue to follow        VTE prophylaxis: heparin

## 2019-05-29 NOTE — DOCUMENTATION QUERY
[facility]                                                                       Query Response Note      PATIENT:               EROS GAYTAN  ACCT #:                  [id number]  MRN:                     [id number]  :                      1955  ADMIT DATE:       2019 6:03 PM  DISCH DATE:          RESPONDING  PROVIDER #:        [id number]           QUERY TEXT:    Please clarify in documentation the relationship, if any, between CVA and right side weakness.      NOTE: If an appropriate response is not listed below, please respond with a new note.    The patient's Clinical Indicators include:  CVA  Right upper extremity strength 3/5  Right lower extremity strength 4/5   Physical therapy  Occupational therapy  Physiatry consult    Query created by: Kimberley Rodríguez on 2019 12:51 PM    RESPONSE TEXT:    Right sided weakness is due to/associated with CVA          Electronically signed by:  JOHANA PHILLIPS MD 2019 4:30 PM

## 2019-05-30 ENCOUNTER — PATIENT OUTREACH (OUTPATIENT)
Dept: HEALTH INFORMATION MANAGEMENT | Facility: OTHER | Age: 64
End: 2019-05-30

## 2019-05-30 VITALS
HEART RATE: 61 BPM | RESPIRATION RATE: 18 BRPM | OXYGEN SATURATION: 99 % | SYSTOLIC BLOOD PRESSURE: 128 MMHG | DIASTOLIC BLOOD PRESSURE: 62 MMHG | TEMPERATURE: 98.7 F | BODY MASS INDEX: 21.22 KG/M2 | HEIGHT: 69 IN | WEIGHT: 143.3 LBS

## 2019-05-30 LAB — GLUCOSE BLD-MCNC: 131 MG/DL (ref 65–99)

## 2019-05-30 PROCEDURE — 700102 HCHG RX REV CODE 250 W/ 637 OVERRIDE(OP): Performed by: HOSPITALIST

## 2019-05-30 PROCEDURE — 700111 HCHG RX REV CODE 636 W/ 250 OVERRIDE (IP): Performed by: HOSPITALIST

## 2019-05-30 PROCEDURE — A9270 NON-COVERED ITEM OR SERVICE: HCPCS | Performed by: HOSPITALIST

## 2019-05-30 PROCEDURE — 82962 GLUCOSE BLOOD TEST: CPT

## 2019-05-30 PROCEDURE — 99239 HOSP IP/OBS DSCHRG MGMT >30: CPT | Performed by: HOSPITALIST

## 2019-05-30 RX ORDER — ATORVASTATIN CALCIUM 80 MG/1
80 TABLET, FILM COATED ORAL EVERY EVENING
Qty: 30 TAB | Refills: 0 | Status: SHIPPED | OUTPATIENT
Start: 2019-05-30

## 2019-05-30 RX ORDER — ASPIRIN 325 MG
1 TABLET ORAL DAILY
Qty: 100 TAB | Refills: 0 | COMMUNITY
Start: 2019-05-30

## 2019-05-30 RX ADMIN — METFORMIN HYDROCHLORIDE 1000 MG: 500 TABLET, FILM COATED ORAL at 06:00

## 2019-05-30 RX ADMIN — ATORVASTATIN CALCIUM 80 MG: 80 TABLET, FILM COATED ORAL at 16:18

## 2019-05-30 RX ADMIN — SENNOSIDES,DOCUSATE SODIUM 2 TABLET: 8.6; 5 TABLET, FILM COATED ORAL at 16:16

## 2019-05-30 RX ADMIN — LOSARTAN POTASSIUM 25 MG: 50 TABLET ORAL at 06:09

## 2019-05-30 RX ADMIN — HEPARIN SODIUM 5000 UNITS: 5000 INJECTION, SOLUTION INTRAVENOUS; SUBCUTANEOUS at 10:12

## 2019-05-30 RX ADMIN — METFORMIN HYDROCHLORIDE 1000 MG: 500 TABLET, FILM COATED ORAL at 18:00

## 2019-05-30 RX ADMIN — SENNOSIDES,DOCUSATE SODIUM 2 TABLET: 8.6; 5 TABLET, FILM COATED ORAL at 06:10

## 2019-05-30 RX ADMIN — ASPIRIN 325 MG: 325 TABLET, FILM COATED ORAL at 16:13

## 2019-05-30 NOTE — PROGRESS NOTES
A/Ox4. Denies numbness/tingling/pain. Displays right sided weakness, he states he feels improvement in his strength on his right side, and upper right facial droop. No complaints at this time. Pt and family updated on POC. All questions answered.

## 2019-05-30 NOTE — PROGRESS NOTES
RN MOBILITY NOTE     Surgery patient?: no  Date of surgery: n/a  Ambulated 50 ft on day of surgery? (N/A if today is not date of surgery): n/a  Number of times ambulated 50 feet or greater today: 3  Patient has been up to chair, edge of bed or HOB 90 degrees for all meals?: yes  Goal met? (goal is ambulating at least 50 feet 2 times on day shift, one time on night shift): yes  If patient did not meet mobility goal, why?:

## 2019-05-30 NOTE — CARE PLAN
Problem: Safety  Goal: Will remain free from injury  Outcome: PROGRESSING AS EXPECTED  Pt calls for assistance, bed alarm on

## 2019-05-30 NOTE — DISCHARGE SUMMARY
Discharge Summary    CHIEF COMPLAINT ON ADMISSION  Chief Complaint   Patient presents with   • Possible Stroke     Family reports symptoms to have started around 1000 with right sides weakness/unsteady giat and slurred speech that progressively was worse around 1600.    • Slurred Speech   • Unsteady Gait   • Weakness       Reason for Admission  Sent by Luis REID diffic*     Admission Date  5/24/2019    CODE STATUS  Full Code    HPI & HOSPITAL COURSE   Patient is a 63 y.o. male  with past medical history of diabetes, hypertension who presented with changes in speech and right-sided leg weakness.    He was outside the window for any TPA. He was found to have a small ischemic stroke involving his left paramedian dior.  His dizziness has resolved and his right sided weakness has improved.  He has been cleared by physical therapy and occupational therapy and was cleared by neurology. Telemetry monitoring and echo were within normal limits.  He agrees to continue outpatient treatment and monitoring of his diabetes and blood pressure. He will be discharged on high dose statin and aspirin and will follow up with pcp and neurology as an outpatient.    Therefore, he is discharged in fair and stable condition to home with organized home healthcare and close outpatient follow-up.    The patient met 2-midnight criteria for an inpatient stay at the time of discharge.    Discharge Date  5/3/0/19    FOLLOW UP ITEMS POST DISCHARGE  Pcp  neurology    DISCHARGE DIAGNOSES  Principal Problem:    CVA (cerebral vascular accident) (HCC) POA: Unknown  Active Problems:    HTN (hypertension) POA: Unknown    DM (diabetes mellitus) (HCC) POA: Unknown  Resolved Problems:    * No resolved hospital problems. *      FOLLOW UP  No future appointments.  RADHA Roa M.D.  [address] [place] [id number]  [facility] [address]  [phone]    In 2 weeks        MEDICATIONS ON DISCHARGE     Medication List      START taking these medications       Instructions   aspirin 325 MG Tabs  Commonly known as:  ASA   Take 1 Tab by mouth every day.  Dose:  325 mg        CHANGE how you take these medications      Instructions   atorvastatin 80 MG tablet  What changed:  · medication strength  · how much to take  · when to take this  Commonly known as:  LIPITOR   Take 1 Tab by mouth every evening.  Dose:  80 mg        CONTINUE taking these medications      Instructions   INVOKANA 100 MG Tabs  Generic drug:  Canagliflozin   Take 100 mg by mouth every morning.  Dose:  100 mg     losartan 25 MG Tabs  Commonly known as:  COZAAR   Take 25 mg by mouth every day.  Dose:  25 mg     MENS 50+ MULTI VITAMIN/MIN Tabs   Take 1 Tab by mouth every morning.  Dose:  1 Tab     metformin 1000 MG tablet  Commonly known as:  GLUCOPHAGE   Take 1,000 mg by mouth 2 Times a Day.  Dose:  1000 mg            Allergies  No Known Allergies    DIET  Orders Placed This Encounter   Procedures   • Diet Order Regular (no beef or pork)     Standing Status:   Standing     Number of Occurrences:   1     Order Specific Question:   Diet:     Answer:   Regular [1]     Comments:   no beef or pork     Order Specific Question:   Texture/Fiber modifications:     Answer:   Dysphagia 3(Mechanical Soft)specify fluid consistency(question 6) [3]     Comments:   no beef or pork     Order Specific Question:   Consistency/Fluid modifications:     Answer:   Thin Liquids [3]       ACTIVITY  As tolerated.    CONSULTATIONS  neurology    PROCEDURES  EC-ECHOCARDIOGRAM COMPLETE W/O CONT   Final Result      MR-BRAIN-W/O   Final Result      1.  Acute left pontine paramedian infarct.   2.  Multiple chronic lacunar infarcts.   3.  Moderate chronic microvascular ischemic disease.   4.  Moderate cerebral atrophy.      DX-CHEST-PORTABLE (1 VIEW)   Final Result      No acute cardiac or pulmonary abnormality is noted.      CT-CTA HEAD WITH & W/O-POST PROCESS   Final Result      No large vessel occlusion, high-grade stenosis or aneurysm of  the [place].      CT-CTA NECK WITH & W/O-POST PROCESSING   Final Result      CT angiogram of the neck within normal limits.      CT-HEAD W/O   Final Result      1.  No acute intracranial process.      2.  Atrophy and small vessel ischemic change.      CT-CEREBRAL PERFUSION ANALYSIS   Final Result      1.  Cerebral blood flow less than 30% likely representing completed infarct = 0 mL.      2.  T Max more than 6 seconds likely representing combination of completed infarct and ischemia = 0 mL.      3.  Mismatched volume likely representing ischemic brain/penumbra = None      4.  Please note that the cerebral perfusion was performed on the limited brain tissue around the basal ganglia region. Infarct/ischemia outside the CT perfusion sections can be missed in this study.            LABORATORY  Lab Results   Component Value Date    SODIUM 139 05/26/2019    POTASSIUM 3.6 05/26/2019    CHLORIDE 103 05/26/2019    CO2 25 05/26/2019    GLUCOSE 117 (H) 05/26/2019    BUN 25 (H) 05/26/2019    CREATININE 0.96 05/26/2019        Lab Results   Component Value Date    WBC 7.7 05/26/2019    HEMOGLOBIN 15.0 05/26/2019    HEMATOCRIT 44.8 05/26/2019    PLATELETCT 193 05/26/2019        Total time of the discharge process exceeds 45 minutes.

## 2019-05-30 NOTE — PROGRESS NOTES
Discharge instructions reviewed. Encouraged and answered all questions. Discharge to home with relative. Home health set up. Walker delivered to room and taken with patient.

## 2019-05-30 NOTE — DISCHARGE INSTRUCTIONS
"Discharge Instructions    Discharged to home by car with relative. Discharged via wheelchair, hospital escort: Yes.  Special equipment needed: Walker  Be sure to schedule a follow-up appointment with your primary care doctor or any specialists as instructed.     Discharge Plan:   Diet Plan: Discussed  Activity Level: Discussed  Confirmed Follow up Appointment: Patient to Call and Schedule Appointment  Confirmed Symptoms Management: Discussed  Medication Reconciliation Updated: Yes  Influenza Vaccine Indication: Indicated: 9 to 64 years of age    I understand that a diet low in cholesterol, fat, and sodium is recommended for good health. Unless I have been given specific instructions below for another diet, I accept this instruction as my diet prescription.   Other diet: Diabetic    Special Instructions:   Follow up w/ Neuro MD in coming months    · Is patient discharged on Warfarin / Coumadin?   No     STROKE POCKET CARD    Stroke Recommendations    Action Details      Neuro checks  Per physician order set or \"Assessment Frequency Guidelines\".     DVT/VTE prophylaxis by 2nd day Stroke pts are at increased risk of developing a DVT. Consider both pharmacological (Lovenox & Heparin SQ) and mechanical (SCD's).   Anticoagulation therapy for Pts with A-fib/flutter Atrial fibrillation/flutter is the most common form of cardiac arrhythmia. Blood pools in the atria of the heart, which can result in the formation of clots. Anticoagulation therapy can prevent initial stroke and prevent recurrent ischemic stroke. Pt's with a-fib/flutter should be discharged on anticoagulation unless contraindicated.    Antithrombotic medication by end of 2nd day ASA, Plavix, Aggrenox, Coumadin. Antithrombotic medication should be given by the end of the second day unless contraindicated. If NPO, consider alternate routes.   Discharge on Statin Medication  Obtain a lipid profile within 48 hours of admission. Patients with high cholesterol (LDL " >100), without a current lipid panel, or who were on lipid lowering meds prior to admit, may need a discharge Rx for a statin medication. If contraindicated, MD must document reason.      Dysphagia screen  Aspiration is a concern for pts with neurological deficits. Swallow screen or swallowing eval by Speech Therapy to assess for dysphagia must be performed prior to any oral intake, including meds.    Discharged on antithrombotic Reduce stroke mortality and morbidity; prevent recurrence.  Consider ASA, Plavix, Coumadin or Aggrenox.        Action  Details   Smoking cessation Cigarettes and other tobacco products can cause damage to blood vessels and increase the risk of stroke. (Provide Smoking Cessation counseling. Emphasize quitting if smoked in last 12 months).   Rehab assessment All patients suspected of having a stroke should be assessed for rehabilitation needs. This includes PT, OT, SLP referrals/interventions. If the symptoms resolve, MD needs to document that no rehab was needed.   Stroke Education should be given to patient or caregivers use-Stroke Education Guide. Document in EPIC using the Stroke/CVA/TIA/  Hemorrahagic Ischemia education template. In addition, use the discharge navigator with the appropriate stroke diagnosis. 1 Warning signs & symptoms of stroke: Sudden numbness or weakness of face, arm, leg; sudden confusion, trouble speaking or understanding; sudden trouble seeing in one or both eyes; sudden trouble walking, dizziness, loss of balance or coordination; sudden severe headache with no known cause.    2 Activation of emergency medical system.    3 Patient medications prescribed at discharge. The discharge summary must match the patients' written discharge instructions.    4 Patient risk factors for stroke: HTN, smoking, high cholesterol, diabetes, obesity, poor nutrition, atrial fibrillation, carotid stenosis, illicit drug use, etc.    5 Follow up with physician post discharge.       "  Remember the acronym \"F.A.S.T.\"   F=Face (Ask person to smile-Does one side of the face droop?)  A=Arm (Ask person to raise both arms-Does one arm drift down?)   S=Speech (Ask person to repeat a phrase-Is their speech slurred?)  T=Time (If you observe any of these signs, get help Immediately!)       Stroke Prevention  Some health problems and behaviors may make it more likely for you to have a stroke. Below are ways to lessen your risk of having a stroke.  · Be active for at least 30 minutes on most or all days.  · Do not smoke. Try not to be around others who smoke.  · Do not drink too much alcohol.  ¨ Do not have more than 2 drinks a day if you are a man.  ¨ Do not have more than 1 drink a day if you are a woman and are not pregnant.  · Eat healthy foods, such as fruits and vegetables. If you were put on a specific diet, follow the diet as told.  · Keep your cholesterol levels under control through diet and medicines. Look for foods that are low in saturated fat, trans fat, cholesterol, and are high in fiber.  · If you have diabetes, follow all diet plans and take your medicine as told.  · Ask your doctor if you need treatment to lower your blood pressure. If you have high blood pressure (hypertension), follow all diet plans and take your medicine as told by your doctor.  · If you are 18-39 years old, have your blood pressure checked every 3-5 years. If you are age 40 or older, have your blood pressure checked every year.  · Keep a healthy weight. Eat foods that are low in calories, salt, saturated fat, trans fat, and cholesterol.  · Do not take drugs.  · Avoid birth control pills, if this applies. Talk to your doctor about the risks of taking birth control pills.  · Talk to your doctor if you have sleep problems (sleep apnea).  · Take all medicine as told by your doctor.  ¨ You may be told to take aspirin or blood thinner medicine. Take this medicine as told by your doctor.  ¨ Understand your medicine " instructions.  · Make sure any other conditions you have are being taken care of.  Get help right away if:  · You suddenly lose feeling (you feel numb) or have weakness in your face, arm, or leg.  · Your face or eyelid hangs down to one side.  · You suddenly feel confused.  · You have trouble talking (aphasia) or understanding what people are saying.  · You suddenly have trouble seeing in one or both eyes.  · You suddenly have trouble walking.  · You are dizzy.  · You lose your balance or your movements are clumsy (uncoordinated).  · You suddenly have a very bad headache and you do not know the cause.  · You have new chest pain.  · Your heart feels like it is fluttering or skipping a beat (irregular heartbeat).  Do not wait to see if the symptoms above go away. Get help right away. Call your local emergency services (911 in [place] Do not drive yourself to the hospital.   This information is not intended to replace advice given to you by your health care provider. Make sure you discuss any questions you have with your health care provider.  Document Released: 06/18/2013 Document Revised: 05/25/2017 Document Reviewed: 06/20/2014  [employer] Patient Education © 2017 [employer]    Depression / Suicide Risk    As you are discharged from this [facility] facility, it is important to learn how to keep safe from harming yourself.    Recognize the warning signs:  · Abrupt changes in personality, positive or negative- including increase in energy   · Giving away possessions  · Change in eating patterns- significant weight changes-  positive or negative  · Change in sleeping patterns- unable to sleep or sleeping all the time   · Unwillingness or inability to communicate  · Depression  · Unusual sadness, discouragement and loneliness  · Talk of wanting to die  · Neglect of personal appearance   · Rebelliousness- reckless behavior  · Withdrawal from people/activities they love  · Confusion- inability to  concentrate     If you or a loved one observes any of these behaviors or has concerns about self-harm, here's what you can do:  · Talk about it- your feelings and reasons for harming yourself  · Remove any means that you might use to hurt yourself (examples: pills, rope, extension cords, firearm)  · Get professional help from the community (Mental Health, Substance Abuse, psychological counseling)  · Do not be alone:Call your Safe Contact- someone whom you trust who will be there for you.  · Call your local CRISIS HOTLINE 694-0389 or 643-371-3199  · Call your local Children's Mobile Crisis Response Team [place] (615) 621-6914 or www.[employer] [employer]  · Call the toll free National Suicide Prevention Hotlines   · National Suicide Prevention Lifeline 388-978-KZTG (8993)  · National Hope Line Network 800-SUICIDE (887-7301)

## 2019-05-30 NOTE — PROGRESS NOTES
Report given by day RN (Renita), POC discussed, pt received awake and alert in bed, breathing WNL, no complaints of pain, bed alarm on, scd's on, hourly rounding in place.

## 2019-05-30 NOTE — CARE PLAN
Problem: Infection  Goal: Will remain free from infection  Outcome: PROGRESSING AS EXPECTED  No s/s of infection at this time, will cont to monitor

## 2019-06-02 ENCOUNTER — HOME CARE VISIT (OUTPATIENT)
Dept: HOME HEALTH SERVICES | Facility: HOME HEALTHCARE | Age: 64
End: 2019-06-02
Payer: COMMERCIAL

## 2019-06-02 VITALS
OXYGEN SATURATION: 97 % | RESPIRATION RATE: 18 BRPM | TEMPERATURE: 98.5 F | SYSTOLIC BLOOD PRESSURE: 92 MMHG | BODY MASS INDEX: 21.16 KG/M2 | DIASTOLIC BLOOD PRESSURE: 60 MMHG | HEIGHT: 69 IN | HEART RATE: 62 BPM

## 2019-06-02 PROCEDURE — G0493 RN CARE EA 15 MIN HH/HOSPICE: HCPCS

## 2019-06-02 PROCEDURE — 665001 SOC-HOME HEALTH

## 2019-06-02 ASSESSMENT — ENCOUNTER SYMPTOMS
VOMITING: DENIES
SHORTNESS OF BREATH: T
NAUSEA: DENIES

## 2019-06-02 ASSESSMENT — PATIENT HEALTH QUESTIONNAIRE - PHQ9
1. LITTLE INTEREST OR PLEASURE IN DOING THINGS: 00
CLINICAL INTERPRETATION OF PHQ2 SCORE: 1
2. FEELING DOWN, DEPRESSED, IRRITABLE, OR HOPELESS: 01
5. POOR APPETITE OR OVEREATING: 0 - NOT AT ALL
SUM OF ALL RESPONSES TO PHQ QUESTIONS 1-9: 2

## 2019-06-02 ASSESSMENT — ACTIVITIES OF DAILY LIVING (ADL): OASIS_M1830: 02

## 2019-06-03 ENCOUNTER — HOME CARE VISIT (OUTPATIENT)
Dept: HOME HEALTH SERVICES | Facility: HOME HEALTHCARE | Age: 64
End: 2019-06-03
Payer: COMMERCIAL

## 2019-06-03 ENCOUNTER — ANTICOAGULATION MONITORING (OUTPATIENT)
Dept: MEDICAL GROUP | Facility: PHYSICIAN GROUP | Age: 64
End: 2019-06-03

## 2019-06-03 NOTE — PROGRESS NOTES
Received referral from [facility]. Medications reviewed. No clinically significant interactions noted.

## 2019-06-05 ENCOUNTER — HOME CARE VISIT (OUTPATIENT)
Dept: HOME HEALTH SERVICES | Facility: HOME HEALTHCARE | Age: 64
End: 2019-06-05
Payer: COMMERCIAL

## 2019-06-05 VITALS
TEMPERATURE: 98.7 F | SYSTOLIC BLOOD PRESSURE: 110 MMHG | RESPIRATION RATE: 16 BRPM | DIASTOLIC BLOOD PRESSURE: 55 MMHG | OXYGEN SATURATION: 98 % | HEART RATE: 60 BPM

## 2019-06-05 VITALS
TEMPERATURE: 98.2 F | SYSTOLIC BLOOD PRESSURE: 120 MMHG | OXYGEN SATURATION: 100 % | RESPIRATION RATE: 16 BRPM | HEART RATE: 61 BPM | DIASTOLIC BLOOD PRESSURE: 60 MMHG

## 2019-06-05 PROCEDURE — G0493 RN CARE EA 15 MIN HH/HOSPICE: HCPCS

## 2019-06-05 PROCEDURE — G0151 HHCP-SERV OF PT,EA 15 MIN: HCPCS

## 2019-06-05 SDOH — ECONOMIC STABILITY: HOUSING INSECURITY
HOME SAFETY: PT RECOMMENDED TO HAVE GRAB BAR INSTALLED.  HE REPORTS HE IS FINE.  HE HAS STEP STOOL TO SIT ON IN THE TUB.  TOWEL RACK HAD SPOT ALREADY ON IT PULLED FROM WALL.  EXPLAINED TO PT.  HE DOESN'T PLAN ON BEING HERE LONG.  HE NOTES PROBABLY ABOUT 2-3 MONTH

## 2019-06-05 SDOH — ECONOMIC STABILITY: HOUSING INSECURITY: HOME SAFETY: S ONLY HERE AND THEN HE WILL RETURN TO BANGLADESH

## 2019-06-05 ASSESSMENT — BALANCE ASSESSMENTS
STANDING TO SITTING: 3
TRANSFERS: 4
TURN 360 DEGREES: 2
STANDING TO SITTING: 3 - CONTROLS DESCENT BY USING HANDS
STANDING UNSUPPORTED WITH FEET TOGETHER: 4 - ABLE TO PLACE FEET TOGETHER INDEPENDENTLY AND STAND 1 MINUTE SAFELY
STANDING UNSUPPORTED WITH EYES CLOSED: 3
STANDING ON ONE LEG: 2 - ABLE TO LIFT LEG INDEPENDENTLY AND HOLD >= 3 SECONDS
STANDING UNSUPPORTED ONE FOOT IN FRONT: 2
STANDING ON ONE LEG: 2
SITTING TO STANDING: 4 - ABLE TO STAND WITHOUT USING HANDS AND STABILIZE INDEPENDENTLY
SITTING TO STANDING: 4
TRANSFERS: 4 - ABLE TO TRANSFER SAFELY WITH MINOR USE OF HANDS
LONG VERSION TOTAL SCORE (MAX 56): 46
TURN 360 DEGREES: 2 - ABLE TO TURN 360 DEGREES SAFELY BUT SLOWLY
STANDING UNSUPPORTED WITH FEET TOGETHER: 4
PICK UP OBJECT FROM THE FLOOR FROM A STANDING POSITION: 4 - ABLE TO PICK UP SLIPPER SAFELY AND EASILY
STANDING UNSUPPORTED WITH EYES CLOSED: 3 - ABLE TO STAND 10 SECONDS WITH SUPERVISION
STANDING UNSUPPORTED ONE FOOT IN FRONT: 2 - ABLE TO TAKE SMALL STEP INDEPENDENTLY AND HOLD 30 SECONDS
REACHING FORWARD WITH OUTSTRETCHED ARM WHILE STANDING: 4 - CAN REACH FORWARD CONFIDENTLY 25 CM (10 INCHES)
STANDING UNSUPPORTED: 4

## 2019-06-05 ASSESSMENT — ACTIVITIES OF DAILY LIVING (ADL)
GROOMING_ASSISTANCE: 0
DRESSING_LB_ASSISTANCE: 0
DRESSING_UB_ASSISTANCE: 0
TOILETING_ASSISTANCE: 0
ORAL_CARE_ASSISTANCE: 0
EATING_ASSISTANCE: 0
BATHING_ASSISTANCE: 0

## 2019-06-05 ASSESSMENT — ENCOUNTER SYMPTOMS
NAUSEA: DENIES
VOMITING: DENIES

## 2019-06-10 ENCOUNTER — HOME CARE VISIT (OUTPATIENT)
Dept: HOME HEALTH SERVICES | Facility: HOME HEALTHCARE | Age: 64
End: 2019-06-10
Payer: COMMERCIAL

## 2019-06-10 VITALS
RESPIRATION RATE: 16 BRPM | DIASTOLIC BLOOD PRESSURE: 60 MMHG | OXYGEN SATURATION: 98 % | HEART RATE: 70 BPM | SYSTOLIC BLOOD PRESSURE: 120 MMHG | TEMPERATURE: 98.6 F

## 2019-06-10 PROCEDURE — G0299 HHS/HOSPICE OF RN EA 15 MIN: HCPCS

## 2019-06-10 PROCEDURE — G0151 HHCP-SERV OF PT,EA 15 MIN: HCPCS

## 2019-06-10 ASSESSMENT — ENCOUNTER SYMPTOMS
NAUSEA: DENIES
VOMITING: DENIES

## 2019-06-11 ENCOUNTER — HOME CARE VISIT (OUTPATIENT)
Dept: HOME HEALTH SERVICES | Facility: HOME HEALTHCARE | Age: 64
End: 2019-06-11
Payer: COMMERCIAL

## 2019-06-11 VITALS
SYSTOLIC BLOOD PRESSURE: 149 MMHG | DIASTOLIC BLOOD PRESSURE: 86 MMHG | TEMPERATURE: 98.3 F | OXYGEN SATURATION: 99 % | HEART RATE: 60 BPM | RESPIRATION RATE: 20 BRPM

## 2019-06-13 ENCOUNTER — HOME CARE VISIT (OUTPATIENT)
Dept: HOME HEALTH SERVICES | Facility: HOME HEALTHCARE | Age: 64
End: 2019-06-13
Payer: COMMERCIAL

## 2019-06-13 VITALS
OXYGEN SATURATION: 99 % | HEART RATE: 73 BPM | DIASTOLIC BLOOD PRESSURE: 70 MMHG | TEMPERATURE: 98.5 F | RESPIRATION RATE: 18 BRPM | SYSTOLIC BLOOD PRESSURE: 124 MMHG

## 2019-06-13 PROCEDURE — G0151 HHCP-SERV OF PT,EA 15 MIN: HCPCS

## 2019-06-13 PROCEDURE — G0152 HHCP-SERV OF OT,EA 15 MIN: HCPCS

## 2019-06-13 ASSESSMENT — ACTIVITIES OF DAILY LIVING (ADL)
MEAL_PREP_ASSISTANCE: 5
TRANSPORTATION_ASSISTANCE: 0
ORAL_CARE_ASSISTANCE: 0
EATING_ASSISTANCE: 0
GROOMING_ASSISTANCE: 0
MEAL_PREP_ASSISTANCE: 6
LAUNDRY_ASSISTANCE: 5
DRESSING_UB_ASSISTANCE: 0
HOUSEKEEPING_ASSISTANCE: 5
TOILETING_ASSISTANCE: 0
BATHING_ASSISTANCE: 0
HOUSEKEEPING_ASSISTANCE: 6
TELEPHONE_ASSISTANCE: 0
SHOPPING_ASSISTANCE: 4
LAUNDRY_ASSISTANCE: 6
DRESSING_LB_ASSISTANCE: 0

## 2019-06-14 ENCOUNTER — HOME CARE VISIT (OUTPATIENT)
Dept: HOME HEALTH SERVICES | Facility: HOME HEALTHCARE | Age: 64
End: 2019-06-14
Payer: COMMERCIAL

## 2019-06-14 VITALS
HEART RATE: 68 BPM | RESPIRATION RATE: 16 BRPM | TEMPERATURE: 97.4 F | OXYGEN SATURATION: 96 % | DIASTOLIC BLOOD PRESSURE: 78 MMHG | SYSTOLIC BLOOD PRESSURE: 138 MMHG

## 2019-06-17 ENCOUNTER — HOME CARE VISIT (OUTPATIENT)
Dept: HOME HEALTH SERVICES | Facility: HOME HEALTHCARE | Age: 64
End: 2019-06-17
Payer: COMMERCIAL

## 2019-06-17 VITALS
SYSTOLIC BLOOD PRESSURE: 100 MMHG | TEMPERATURE: 98 F | OXYGEN SATURATION: 97 % | RESPIRATION RATE: 16 BRPM | DIASTOLIC BLOOD PRESSURE: 60 MMHG | HEART RATE: 62 BPM

## 2019-06-17 PROCEDURE — G0151 HHCP-SERV OF PT,EA 15 MIN: HCPCS

## 2019-06-18 ENCOUNTER — HOME CARE VISIT (OUTPATIENT)
Dept: HOME HEALTH SERVICES | Facility: HOME HEALTHCARE | Age: 64
End: 2019-06-18
Payer: COMMERCIAL

## 2019-06-18 VITALS
SYSTOLIC BLOOD PRESSURE: 110 MMHG | HEART RATE: 57 BPM | OXYGEN SATURATION: 99 % | DIASTOLIC BLOOD PRESSURE: 60 MMHG | RESPIRATION RATE: 16 BRPM | TEMPERATURE: 98.9 F

## 2019-06-18 PROCEDURE — G0493 RN CARE EA 15 MIN HH/HOSPICE: HCPCS

## 2019-06-18 ASSESSMENT — ENCOUNTER SYMPTOMS
VOMITING: DENIES
NAUSEA: DENIES

## 2019-06-20 ENCOUNTER — HOME CARE VISIT (OUTPATIENT)
Dept: HOME HEALTH SERVICES | Facility: HOME HEALTHCARE | Age: 64
End: 2019-06-20
Payer: COMMERCIAL

## 2019-06-20 ENCOUNTER — HOME CARE VISIT (OUTPATIENT)
Dept: HOME HEALTH SERVICES | Facility: HOME HEALTHCARE | Age: 64
End: 2019-06-20

## 2019-06-20 VITALS
HEART RATE: 62 BPM | RESPIRATION RATE: 16 BRPM | SYSTOLIC BLOOD PRESSURE: 100 MMHG | DIASTOLIC BLOOD PRESSURE: 60 MMHG | TEMPERATURE: 98.9 F | OXYGEN SATURATION: 99 %

## 2019-06-20 PROCEDURE — G0493 RN CARE EA 15 MIN HH/HOSPICE: HCPCS

## 2019-06-20 ASSESSMENT — SOCIAL DETERMINANTS OF HEALTH (SDOH)
IS CONCERNED ABOUT INCOME: N
NEEDS HELP WITH INCOME CONCERNS: N
HAS ADEQUATE INCOME: N

## 2019-06-24 ENCOUNTER — HOME CARE VISIT (OUTPATIENT)
Dept: HOME HEALTH SERVICES | Facility: HOME HEALTHCARE | Age: 64
End: 2019-06-24
Payer: COMMERCIAL

## 2019-06-27 ENCOUNTER — OFFICE VISIT (OUTPATIENT)
Dept: NEUROLOGY | Facility: MEDICAL CENTER | Age: 64
End: 2019-06-27

## 2019-06-27 ENCOUNTER — HOME CARE VISIT (OUTPATIENT)
Dept: HOME HEALTH SERVICES | Facility: HOME HEALTHCARE | Age: 64
End: 2019-06-27
Payer: COMMERCIAL

## 2019-06-27 VITALS
TEMPERATURE: 98.2 F | SYSTOLIC BLOOD PRESSURE: 106 MMHG | HEART RATE: 64 BPM | DIASTOLIC BLOOD PRESSURE: 68 MMHG | RESPIRATION RATE: 18 BRPM | OXYGEN SATURATION: 95 %

## 2019-06-27 VITALS
OXYGEN SATURATION: 98 % | HEART RATE: 64 BPM | DIASTOLIC BLOOD PRESSURE: 66 MMHG | BODY MASS INDEX: 20.23 KG/M2 | RESPIRATION RATE: 16 BRPM | WEIGHT: 137 LBS | SYSTOLIC BLOOD PRESSURE: 132 MMHG | TEMPERATURE: 96.3 F

## 2019-06-27 DIAGNOSIS — Z86.73 HISTORY OF STROKE: ICD-10-CM

## 2019-06-27 DIAGNOSIS — I63.9 ISCHEMIC STROKE (HCC): ICD-10-CM

## 2019-06-27 DIAGNOSIS — E11.9 DM TYPE 2, GOAL HBA1C < 7% (HCC): ICD-10-CM

## 2019-06-27 DIAGNOSIS — I69.359 HEMIPARESIS AFFECTING DOMINANT SIDE AS LATE EFFECT OF CEREBROVASCULAR ACCIDENT (CVA) (HCC): ICD-10-CM

## 2019-06-27 DIAGNOSIS — Z13.31 SCREENING FOR DEPRESSION: ICD-10-CM

## 2019-06-27 DIAGNOSIS — Z09 HOSPITAL DISCHARGE FOLLOW-UP: ICD-10-CM

## 2019-06-27 DIAGNOSIS — I10 ESSENTIAL HYPERTENSION: ICD-10-CM

## 2019-06-27 PROCEDURE — 99205 OFFICE O/P NEW HI 60 MIN: CPT | Performed by: NURSE PRACTITIONER

## 2019-06-27 PROCEDURE — G0151 HHCP-SERV OF PT,EA 15 MIN: HCPCS

## 2019-06-27 ASSESSMENT — ACTIVITIES OF DAILY LIVING (ADL)
HOME_HEALTH_OASIS: 00
OASIS_M1830: 01

## 2019-06-27 NOTE — PROGRESS NOTES
Chief Complaint   Patient presents with   • New Patient     stroke       Problem List Items Addressed This Visit     HTN (hypertension)      Other Visit Diagnoses     Ischemic stroke (HCC)        DM type 2, goal HbA1c < 7% (HCC)        Screening for depression        Hemiparesis affecting dominant side as late effect of cerebrovascular accident (CVA) (HCC)        History of stroke        Hospital discharge follow-up              History of present illness:  Delbert Altamirano 63 y.o. male presents today with his son's friend for stroke Bridge follow-up.  PMHx: Diabetes type 2 and HTN    Patient was seen in the hospital on 5/24/2018 for complaints of changes in speech and right-sided weakness. He was discharged on ASA 325mg and lipitor 80mg daily. Pt was not taking asa before.     Today he continues to have right sided weakness, leg worse than arm per pt.This is his dominant side. No speech issues anymore.     He has home health and PT.     Pt is not smoking, not drinking alcohol.     Mood is good not suicidal or homicidal.       Past medical history:   History reviewed. No pertinent past medical history.    Past surgical history:   History reviewed. No pertinent surgical history.    Family history:   Family History   Problem Relation Age of Onset   • Stroke Mother        Social history:   Social History     Social History   • Marital status:      Spouse name: N/A   • Number of children: N/A   • Years of education: N/A     Occupational History   • Not on file.     Social History Main Topics   • Smoking status: Never Smoker   • Smokeless tobacco: Never Used   • Alcohol use Not on file   • Drug use: Unknown   • Sexual activity: Not on file     Other Topics Concern   • Not on file     Social History Narrative   • No narrative on file       Current medications:   Current Outpatient Prescriptions   Medication   • aspirin (ASA) 325 MG Tab   • atorvastatin (LIPITOR) 80 MG tablet   • losartan (COZAAR) 25 MG Tab   •  Canagliflozin (INVOKANA) 100 MG Tab   • metformin (GLUCOPHAGE) 1000 MG tablet   • Multiple Vitamins-Minerals (MENS 50+ MULTI VITAMIN/MIN) Tab     No current facility-administered medications for this visit.        Medication Allergy:  No Known Allergies    Review of systems:   General: Denies fevers or chills, or nightsweats, or generalized fatigue.    Head: Denies headaches or dizziness or lightheadedness  EENT: Denies vision changes, vision loss or pain, nasal secretion, nasal bleeding, difficulty swallowing, hearing loss, tinnitus, vertigo, ear pain  Endocdrinologic: Denies sweating, cold or heat intolerance. No polyuria or polydipsia.   Respiratory: Denies shortness of breath, cough, sputum, or wheezing  Cardiac: Denies chest pain, palpitations, edema or syncope  Gastrointestinal: Denies nausea, vomiting, no abdominal pain or change in bowel habits, no melena or hematochezia  Urinary: Denies dysuria, frequency, hesitancy, or incontinence.  Dermatologic:  Denies new rash  Musculoskeletal: Denies muscle pain or swelling, no atrophy, no neck and back pain or stiffness.  Right sided weakness.   Neurologic: Denies facial droopiness, paresthesias, ataxia, change in speech or language, memory loss, abnormal movements, seizures, loss of consciousness, or episodes of confusion.   Psychiatric: Denies anxiety, depression, mood swings, suicidal or homicidal thoughts       Physical examination:   Vitals:    06/27/19 1254   BP: 132/66   BP Location: Left arm   Patient Position: Sitting   BP Cuff Size: Adult   Pulse: 64   Resp: 16   Temp: (!) 35.7 °C (96.3 °F)   TempSrc: Temporal   SpO2: 98%   Weight: 62.1 kg (137 lb)     General: Patient in no acute distress, pleasant and cooperative.  HEENT: Normocephalic, no signs of acute trauma.   moist conjunctivae. Nares are patent. Oropharynx clear without lesions and normal  hard and soft palates.   Neck: Supple, No carotid bruits. There is normal range of motion. No  lymphadenopathy  Resp: clear to auscultation bilaterally. No wheezes or crackles.   CV: RRR, no murmurs.   Abdomen: normoactive bowel sounds, soft, non distended or tender.   Skin: no signs of acute rashes or trauma.   Musculoskeletal: joints exhibit full range of motion, without any pain to palpation. There are no signs of joint or muscle swelling. There is no tenderness to deep palpation of muscles.   Psychiatric: No hallucinatory behavior. No symptoms of depression or suicidal ideation. Mood and affect appear normal on exam.     NEUROLOGICAL EXAM:   Mental status, orientation: Awake, alert and fully oriented.   Speech and language: speech is clear and fluent. The patient is able to name, repeat and comprehend.   Memory: There is intact recollection of recent and remote events.   Cranial nerve exam:   CN I: Not examined   CN II: PERRL.   CN III, IV, VI: EOMI; no nystagmus   CN V: Facial sensation intact bilaterally   CN VII: face symmetric   CN VIII: hearing intact to finger rub bilaterally   CN IX, X: palate elevates symmetrically   CN XI: Symmetric shoulder shrug  CN XII: tongue midline. No signs of tongue biting or fasciculations   Motor exam: Strength is 5/5 in all extremities. Tone is normal. No abnormal movements were seen on exam.   Sensory exam reveals normal sense of light touch in all extremities.   Deep tendon reflexes:  2+ throughout. Plantar responses are flexor. There is no clonus.   Coordination: shows a slow finger-nose-finger. Normal rapidly alternating movements.   Gait: The patient was able to get up from seated position on first attempt without requiring assistance. Found to be steady when walking. Movements were fluid with normal arm swing. The patient was able to turn without difficulties or tendency to fall. Romberg exam mild swaying      ANCILLARY DATA REVIEWED:     Lab Data Review:  Reviewed in chart.  Hemoglobin A1c, CBC, CMP, lipid, GFR, PTT    Records reviewed:  Reviewed in  chart.    Imaging:  MRI brain 5/24/2019  1.  Acute left pontine paramedian infarct.  2.  Multiple chronic lacunar infarcts.  3.  Moderate chronic microvascular ischemic disease.  4.  Moderate cerebral atrophy.    Echo 5/24/2019  FINDINGS  Left Ventricle  Normal left ventricular size, thickness, systolic function, and   diastolic function. Normal regional wall motion. Left ventricular   ejection fraction is visually estimated to be 65-70%.    CTA neck 5/24/2019  CT angiogram of the neck within normal limits.    CTA head 5/24/2019  No large vessel occlusion, high-grade stenosis or aneurysm of the [place].    CT head 5/24/2019  1.  No acute intracranial process.  2.  Atrophy and small vessel ischemic change.      ASSESSMENT AND PLAN:    1. Ischemic stroke ([facility])    2. DM type 2, goal HbA1c < 7% ([facility])    3. Screening for depression    4. Hemiparesis affecting dominant side as late effect of cerebrovascular accident (CVA) ([facility])    5. Essential hypertension    6. History of stroke    7. Hospital discharge follow-up        CLINICAL DECISION:  Pt presented to the hospital for acute sx of right sided weakness and changes in speech. MRI brain showed acute left pontine paramedian infarct.  This is likely related to small vessel disease due to uncontrolled vascular risk factors such as hypertension and diabetes. hemoglobin A1c is 7.4% -needs better control.    Pt continues to have c/o right sided hemiparesis. No speech difficulty anymore.       Plan:  -Continue current medications: lipitor, and ASA. BP and blood sugar meds.   -Education given to help control risk factors including:  /80, LDL 70 or less, HBA1C 7 or less.   -Needs close fu with PCP for optimal control of risk factors especially his blood sugar.   -Diet and exercise.   - continue home health and Pt as ordered      Discussed case with Dr. Sun and we both agree with this plan.       FOLLOW-UP:   Return for no further f/u.        EDUCATION AND  COUNSELING:  -Usefulness of anti-platelets in secondary stroke prevention was discussed. The side effects, including increased risk for bleeding (both traumatic and spontaneous) were reviewed with the patient at length.   -Recommended goal for LDL is 70 or less. Side effects of statin, including idiosyncratic reactions as well as more common myalgias, and liver injury  were discussed. Monitoring of LFT’s will be deferred to the patient’s primary care physician.   -Secondary stroke prevention education was provided, including; lifestyle modification with healthy diet, and exercise, as well as recommendations for optimal control of risk factors.   -Close follow up with PCP is recommended.   -Compliance with medications was discussed in detail.   -Smoking cessation education was provided for greater than 3 minutes.   -The patient/family was educated on recognition of stroke symptoms. The patient/family instructed to call 911 EMERGENTLY upon presentation of any of these symptoms/signs, to be taken to nearest emergency department for evaluation and acute care.       The patient understands and agrees that due to the complexity of his/her diagnosis, results of any testing and further recommendations will typically be discussed/made during a face to face encounter in my office. The patient and/or family further understands it is their responsibility to keep proper follow up.         Ariana Gonzalez, MSN, APRN, FNP-C  [facility]  Office: [phone]  Fax: [phone]

## 2019-07-17 ENCOUNTER — OFFICE VISIT (OUTPATIENT)
Dept: URGENT CARE | Facility: CLINIC | Age: 64
End: 2019-07-17

## 2019-07-17 VITALS
HEIGHT: 69 IN | SYSTOLIC BLOOD PRESSURE: 142 MMHG | WEIGHT: 133.8 LBS | HEART RATE: 61 BPM | DIASTOLIC BLOOD PRESSURE: 84 MMHG | TEMPERATURE: 97.4 F | RESPIRATION RATE: 14 BRPM | OXYGEN SATURATION: 97 % | BODY MASS INDEX: 19.82 KG/M2

## 2019-07-17 DIAGNOSIS — H81.13 BENIGN PAROXYSMAL POSITIONAL VERTIGO DUE TO BILATERAL VESTIBULAR DISORDER: ICD-10-CM

## 2019-07-17 PROCEDURE — 99204 OFFICE O/P NEW MOD 45 MIN: CPT | Performed by: FAMILY MEDICINE

## 2019-07-17 RX ORDER — ONDANSETRON 4 MG/1
4 TABLET, ORALLY DISINTEGRATING ORAL ONCE
Status: COMPLETED | OUTPATIENT
Start: 2019-07-17 | End: 2019-07-17

## 2019-07-17 RX ORDER — MECLIZINE HYDROCHLORIDE 25 MG/1
25-50 TABLET ORAL 3 TIMES DAILY PRN
Qty: 30 TAB | Refills: 0 | Status: SHIPPED | OUTPATIENT
Start: 2019-07-17

## 2019-07-17 RX ADMIN — ONDANSETRON 4 MG: 4 TABLET, ORALLY DISINTEGRATING ORAL at 17:30

## 2019-07-24 NOTE — PROGRESS NOTES
Chief Complaint   Patient presents with   • Dizziness                      Patient complains of dizziness for 3 days.  He has a long hx vertigo.      Symptoms are unchanged since onset.   Symptoms are intermittent and describes feeling of dysequilibrium, unsteady on feet.   Worse with bending head to look up, getting up from supine position.    No double vision, hearing loss, numbness, nausea, vomiting, headache, slurred speech or shortness of breath.   Denies depression, anxiety.         Past med hx - BPPV, CVA, DM, HLD    Current Outpatient Prescriptions on File Prior to Visit   Medication Sig Dispense Refill   • aspirin (ASA) 325 MG Tab Take 1 Tab by mouth every day. 100 Tab 0   • atorvastatin (LIPITOR) 80 MG tablet Take 1 Tab by mouth every evening. 30 Tab 0   • losartan (COZAAR) 25 MG Tab Take 25 mg by mouth every day.     • Canagliflozin (INVOKANA) 100 MG Tab Take 100 mg by mouth every morning.     • metformin (GLUCOPHAGE) 1000 MG tablet Take 1,000 mg by mouth 2 Times a Day.     • Multiple Vitamins-Minerals (MENS 50+ MULTI VITAMIN/MIN) Tab Take 1 Tab by mouth every morning.       No current facility-administered medications on file prior to visit.          Social History   Substance Use Topics   • Smoking status: Never Smoker   • Smokeless tobacco: Never Used   • Alcohol use No             Review of Systems   Constitutional: Negative for fever, chills and malaise/fatigue.   Eyes: Negative for vision changes, d/c.    Respiratory: Negative for cough and sputum production.    Cardiovascular: Negative for chest pain and palpitations.   Gastrointestinal: Negative for nausea, vomiting, abdominal pain, diarrhea and constipation.   Genitourinary: Negative for dysuria, urgency and frequency.   Skin: Negative for rash or  itching.   Neurological: Negative for headache and tingling.   Psychiatric/Behavioral: Negative for depression.   Hematologic/lymphatic - denies bruising or excessive bleeding  All other systems  "reviewed and are negative.         Objective:     /84   Pulse 61   Temp 36.3 °C (97.4 °F)   Resp 14   Ht 1.753 m (5' 9\")   Wt 60.7 kg (133 lb 12.8 oz)   SpO2 97%       Physical Exam   Constitutional:  he is oriented to person, place, and time. She appears well-developed and well-nourished. No distress.   HENT:   Head: Normocephalic and atraumatic. EOMI.  PERRLA.  No nystagmus  Mouth/Throat: No oropharyngeal exudate.   TMs normal   Eyes: Conjunctivae are normal.   Cardiovascular: Normal rate, regular rhythm and normal heart sounds.    Pulmonary/Chest: Effort normal and breath sounds normal. No respiratory distress. Pt has no wheezes, rales.   Neurological: pt is alert and oriented to person, place, and time. No cranial nerve deficit. Coordination and gait normal.   Skin: Skin is warm. he is not diaphoretic. No erythema.   Psychiatric:  behavior is normal.   Nursing note and vitals reviewed.              Assessment/Plan:     1. BPPV (benign paroxysmal positional vertigo), unspecified laterality     - meclizine (ANTIVERT) 25 MG Tab; Take 1 Tab by mouth 3 times a day as needed.  Dispense: 30 Tab; Refill: 0    Follow up in one week if no improvement, sooner if symptoms worsen.       "